# Patient Record
Sex: FEMALE | Race: WHITE | Employment: OTHER | URBAN - METROPOLITAN AREA
[De-identification: names, ages, dates, MRNs, and addresses within clinical notes are randomized per-mention and may not be internally consistent; named-entity substitution may affect disease eponyms.]

---

## 2017-01-05 ENCOUNTER — TELEPHONE (OUTPATIENT)
Dept: FAMILY MEDICINE CLINIC | Age: 72
End: 2017-01-05

## 2017-01-16 ENCOUNTER — OFFICE VISIT (OUTPATIENT)
Dept: FAMILY MEDICINE CLINIC | Age: 72
End: 2017-01-16

## 2017-01-16 VITALS
HEART RATE: 120 BPM | SYSTOLIC BLOOD PRESSURE: 132 MMHG | WEIGHT: 226 LBS | DIASTOLIC BLOOD PRESSURE: 82 MMHG | BODY MASS INDEX: 38.58 KG/M2 | RESPIRATION RATE: 24 BRPM | HEIGHT: 64 IN | TEMPERATURE: 98.5 F

## 2017-01-16 DIAGNOSIS — Z72.0 TOBACCO ABUSE: ICD-10-CM

## 2017-01-16 DIAGNOSIS — Z12.11 COLON CANCER SCREENING: ICD-10-CM

## 2017-01-16 DIAGNOSIS — J44.9 COPD WITH ASTHMA (HCC): Primary | ICD-10-CM

## 2017-01-16 DIAGNOSIS — E78.5 HYPERLIPIDEMIA, UNSPECIFIED HYPERLIPIDEMIA TYPE: ICD-10-CM

## 2017-01-16 DIAGNOSIS — Z13.31 POSITIVE DEPRESSION SCREENING: ICD-10-CM

## 2017-01-16 DIAGNOSIS — I10 ESSENTIAL HYPERTENSION: ICD-10-CM

## 2017-01-16 PROCEDURE — 3017F COLORECTAL CA SCREEN DOC REV: CPT | Performed by: FAMILY MEDICINE

## 2017-01-16 PROCEDURE — G8419 CALC BMI OUT NRM PARAM NOF/U: HCPCS | Performed by: FAMILY MEDICINE

## 2017-01-16 PROCEDURE — 4040F PNEUMOC VAC/ADMIN/RCVD: CPT | Performed by: FAMILY MEDICINE

## 2017-01-16 PROCEDURE — 1123F ACP DISCUSS/DSCN MKR DOCD: CPT | Performed by: FAMILY MEDICINE

## 2017-01-16 PROCEDURE — G8484 FLU IMMUNIZE NO ADMIN: HCPCS | Performed by: FAMILY MEDICINE

## 2017-01-16 PROCEDURE — 4004F PT TOBACCO SCREEN RCVD TLK: CPT | Performed by: FAMILY MEDICINE

## 2017-01-16 PROCEDURE — 1090F PRES/ABSN URINE INCON ASSESS: CPT | Performed by: FAMILY MEDICINE

## 2017-01-16 PROCEDURE — G8926 SPIRO NO PERF OR DOC: HCPCS | Performed by: FAMILY MEDICINE

## 2017-01-16 PROCEDURE — 3014F SCREEN MAMMO DOC REV: CPT | Performed by: FAMILY MEDICINE

## 2017-01-16 PROCEDURE — 3023F SPIROM DOC REV: CPT | Performed by: FAMILY MEDICINE

## 2017-01-16 PROCEDURE — 99214 OFFICE O/P EST MOD 30 MIN: CPT | Performed by: FAMILY MEDICINE

## 2017-01-16 PROCEDURE — G8427 DOCREV CUR MEDS BY ELIG CLIN: HCPCS | Performed by: FAMILY MEDICINE

## 2017-01-16 PROCEDURE — G8431 POS CLIN DEPRES SCRN F/U DOC: HCPCS | Performed by: FAMILY MEDICINE

## 2017-01-16 PROCEDURE — G8400 PT W/DXA NO RESULTS DOC: HCPCS | Performed by: FAMILY MEDICINE

## 2017-01-16 PROCEDURE — G0444 DEPRESSION SCREEN ANNUAL: HCPCS | Performed by: FAMILY MEDICINE

## 2017-01-16 RX ORDER — DILTIAZEM HYDROCHLORIDE 120 MG/1
120 CAPSULE, COATED, EXTENDED RELEASE ORAL DAILY
Qty: 90 CAPSULE | Refills: 3 | Status: SHIPPED | OUTPATIENT
Start: 2017-01-16 | End: 2018-02-23 | Stop reason: SDUPTHER

## 2017-01-16 RX ORDER — PRAVASTATIN SODIUM 20 MG
TABLET ORAL
Qty: 90 TABLET | Refills: 3 | Status: SHIPPED | OUTPATIENT
Start: 2017-01-16 | End: 2018-02-13 | Stop reason: SDUPTHER

## 2017-01-16 RX ORDER — ALBUTEROL SULFATE 90 UG/1
2 AEROSOL, METERED RESPIRATORY (INHALATION) EVERY 6 HOURS PRN
Qty: 3 INHALER | Refills: 3 | Status: SHIPPED | OUTPATIENT
Start: 2017-01-16 | End: 2018-02-26 | Stop reason: SDUPTHER

## 2017-01-16 ASSESSMENT — PATIENT HEALTH QUESTIONNAIRE - PHQ9
SUM OF ALL RESPONSES TO PHQ QUESTIONS 1-9: 12
9. THOUGHTS THAT YOU WOULD BE BETTER OFF DEAD, OR OF HURTING YOURSELF: 0
3. TROUBLE FALLING OR STAYING ASLEEP: 2
6. FEELING BAD ABOUT YOURSELF - OR THAT YOU ARE A FAILURE OR HAVE LET YOURSELF OR YOUR FAMILY DOWN: 3
7. TROUBLE CONCENTRATING ON THINGS, SUCH AS READING THE NEWSPAPER OR WATCHING TELEVISION: 0
8. MOVING OR SPEAKING SO SLOWLY THAT OTHER PEOPLE COULD HAVE NOTICED. OR THE OPPOSITE, BEING SO FIGETY OR RESTLESS THAT YOU HAVE BEEN MOVING AROUND A LOT MORE THAN USUAL: 0
1. LITTLE INTEREST OR PLEASURE IN DOING THINGS: 2
SUM OF ALL RESPONSES TO PHQ9 QUESTIONS 1 & 2: 5
10. IF YOU CHECKED OFF ANY PROBLEMS, HOW DIFFICULT HAVE THESE PROBLEMS MADE IT FOR YOU TO DO YOUR WORK, TAKE CARE OF THINGS AT HOME, OR GET ALONG WITH OTHER PEOPLE: 0
4. FEELING TIRED OR HAVING LITTLE ENERGY: 2
2. FEELING DOWN, DEPRESSED OR HOPELESS: 3

## 2017-01-16 ASSESSMENT — ENCOUNTER SYMPTOMS
RHINORRHEA: 0
CONSTIPATION: 0
COUGH: 0
EYE ITCHING: 1
COUGH: 1
DIARRHEA: 0
CONSTIPATION: 1
WHEEZING: 1
ABDOMINAL PAIN: 0
EYE DISCHARGE: 1
SHORTNESS OF BREATH: 1
SORE THROAT: 0

## 2017-01-16 ASSESSMENT — COPD QUESTIONNAIRES: COPD: 1

## 2017-01-19 ENCOUNTER — TELEPHONE (OUTPATIENT)
Dept: FAMILY MEDICINE CLINIC | Age: 72
End: 2017-01-19

## 2017-01-19 DIAGNOSIS — J44.9 COPD WITH ASTHMA (HCC): Primary | ICD-10-CM

## 2017-01-20 RX ORDER — ALBUTEROL SULFATE 90 UG/1
2 AEROSOL, METERED RESPIRATORY (INHALATION) EVERY 6 HOURS PRN
Qty: 1 INHALER | Refills: 3 | Status: SHIPPED | OUTPATIENT
Start: 2017-01-20 | End: 2018-02-26 | Stop reason: SDUPTHER

## 2017-02-07 ENCOUNTER — HOSPITAL ENCOUNTER (OUTPATIENT)
Age: 72
Discharge: HOME | End: 2017-02-07
Payer: MEDICARE

## 2017-02-07 ENCOUNTER — HOSPITAL ENCOUNTER (OUTPATIENT)
Dept: GENERAL RADIOLOGY | Age: 72
Discharge: HOME | End: 2017-02-07
Payer: MEDICARE

## 2017-02-07 DIAGNOSIS — M54.2 CERVICALGIA: ICD-10-CM

## 2017-02-07 PROCEDURE — 72050 X-RAY EXAM NECK SPINE 4/5VWS: CPT

## 2017-02-09 ENCOUNTER — HOSPITAL ENCOUNTER (OUTPATIENT)
Dept: MRI IMAGING | Age: 72
Discharge: HOME | End: 2017-02-09
Payer: MEDICARE

## 2017-02-09 ENCOUNTER — TELEPHONE (OUTPATIENT)
Dept: FAMILY MEDICINE CLINIC | Age: 72
End: 2017-02-09

## 2017-02-09 VITALS — WEIGHT: 220 LBS | BODY MASS INDEX: 37.76 KG/M2

## 2017-02-09 DIAGNOSIS — M54.16 LUMBAR RADICULOPATHY: ICD-10-CM

## 2017-02-09 DIAGNOSIS — Z12.11 COLON CANCER SCREENING: ICD-10-CM

## 2017-02-09 LAB
CONTROL: NORMAL
HEMOCCULT STL QL: NEGATIVE

## 2017-02-09 PROCEDURE — 72148 MRI LUMBAR SPINE W/O DYE: CPT

## 2017-02-09 PROCEDURE — 82274 ASSAY TEST FOR BLOOD FECAL: CPT | Performed by: FAMILY MEDICINE

## 2017-04-24 DIAGNOSIS — J44.9 COPD WITH ASTHMA (HCC): ICD-10-CM

## 2017-05-24 ENCOUNTER — HOSPITAL ENCOUNTER (OUTPATIENT)
Age: 72
Setting detail: SPECIMEN
Discharge: HOME | End: 2017-05-24
Payer: MEDICARE

## 2017-05-24 ENCOUNTER — OFFICE VISIT (OUTPATIENT)
Dept: FAMILY MEDICINE CLINIC | Age: 72
End: 2017-05-24

## 2017-05-24 VITALS
WEIGHT: 224 LBS | SYSTOLIC BLOOD PRESSURE: 124 MMHG | HEART RATE: 96 BPM | RESPIRATION RATE: 16 BRPM | OXYGEN SATURATION: 97 % | DIASTOLIC BLOOD PRESSURE: 78 MMHG | BODY MASS INDEX: 38.45 KG/M2

## 2017-05-24 DIAGNOSIS — M25.471 SWELLING OF BOTH ANKLES: ICD-10-CM

## 2017-05-24 DIAGNOSIS — R33.9 URINARY RETENTION: ICD-10-CM

## 2017-05-24 DIAGNOSIS — R06.02 SHORTNESS OF BREATH: ICD-10-CM

## 2017-05-24 DIAGNOSIS — M25.471 SWELLING OF BOTH ANKLES: Primary | ICD-10-CM

## 2017-05-24 DIAGNOSIS — M25.472 SWELLING OF BOTH ANKLES: Primary | ICD-10-CM

## 2017-05-24 DIAGNOSIS — M25.472 SWELLING OF BOTH ANKLES: ICD-10-CM

## 2017-05-24 LAB
ANION GAP SERPL CALCULATED.3IONS-SCNC: 12 MEQ/L (ref 7–13)
BILIRUBIN, POC: NORMAL
BLOOD URINE, POC: NORMAL
BUN BLDV-MCNC: 16 MG/DL (ref 8–23)
CALCIUM SERPL-MCNC: 9.6 MG/DL (ref 8.6–10.2)
CHLORIDE BLD-SCNC: 101 MEQ/L (ref 98–107)
CLARITY, POC: CLEAR
CO2: 28 MEQ/L (ref 22–29)
COLOR, POC: YELLOW
CREAT SERPL-MCNC: 0.89 MG/DL (ref 0.5–0.9)
GFR AFRICAN AMERICAN: >60
GFR NON-AFRICAN AMERICAN: >60
GLUCOSE BLD-MCNC: 109 MG/DL (ref 74–109)
GLUCOSE URINE, POC: NORMAL
KETONES, POC: NORMAL
LEUKOCYTE EST, POC: NORMAL
NITRITE, POC: NORMAL
PH, POC: 5.5
POTASSIUM SERPL-SCNC: 4.8 MEQ/L (ref 3.5–5.1)
PRO-BNP: 169 PG/ML
PROTEIN, POC: NORMAL
SODIUM BLD-SCNC: 141 MEQ/L (ref 132–144)
SPECIFIC GRAVITY, POC: 1.03
URIC ACID, SERUM: 7.5 MG/DL (ref 2.4–5.7)
UROBILINOGEN, POC: NORMAL

## 2017-05-24 PROCEDURE — 4040F PNEUMOC VAC/ADMIN/RCVD: CPT | Performed by: PHYSICIAN ASSISTANT

## 2017-05-24 PROCEDURE — 1090F PRES/ABSN URINE INCON ASSESS: CPT | Performed by: PHYSICIAN ASSISTANT

## 2017-05-24 PROCEDURE — 1123F ACP DISCUSS/DSCN MKR DOCD: CPT | Performed by: PHYSICIAN ASSISTANT

## 2017-05-24 PROCEDURE — G8427 DOCREV CUR MEDS BY ELIG CLIN: HCPCS | Performed by: PHYSICIAN ASSISTANT

## 2017-05-24 PROCEDURE — 80048 BASIC METABOLIC PNL TOTAL CA: CPT

## 2017-05-24 PROCEDURE — 3014F SCREEN MAMMO DOC REV: CPT | Performed by: PHYSICIAN ASSISTANT

## 2017-05-24 PROCEDURE — 4004F PT TOBACCO SCREEN RCVD TLK: CPT | Performed by: PHYSICIAN ASSISTANT

## 2017-05-24 PROCEDURE — G8400 PT W/DXA NO RESULTS DOC: HCPCS | Performed by: PHYSICIAN ASSISTANT

## 2017-05-24 PROCEDURE — 99213 OFFICE O/P EST LOW 20 MIN: CPT | Performed by: PHYSICIAN ASSISTANT

## 2017-05-24 PROCEDURE — 81002 URINALYSIS NONAUTO W/O SCOPE: CPT | Performed by: PHYSICIAN ASSISTANT

## 2017-05-24 PROCEDURE — 84550 ASSAY OF BLOOD/URIC ACID: CPT

## 2017-05-24 PROCEDURE — G8417 CALC BMI ABV UP PARAM F/U: HCPCS | Performed by: PHYSICIAN ASSISTANT

## 2017-05-24 PROCEDURE — 3017F COLORECTAL CA SCREEN DOC REV: CPT | Performed by: PHYSICIAN ASSISTANT

## 2017-05-24 PROCEDURE — 83880 ASSAY OF NATRIURETIC PEPTIDE: CPT

## 2017-05-25 DIAGNOSIS — M10.9 ACUTE GOUT OF ANKLE, UNSPECIFIED CAUSE, UNSPECIFIED LATERALITY: Primary | ICD-10-CM

## 2017-05-25 RX ORDER — INDOMETHACIN 50 MG/1
50 CAPSULE ORAL 3 TIMES DAILY
Qty: 21 CAPSULE | Refills: 0 | Status: SHIPPED | OUTPATIENT
Start: 2017-05-25 | End: 2019-05-17 | Stop reason: ALTCHOICE

## 2017-05-26 DIAGNOSIS — Z12.31 VISIT FOR SCREENING MAMMOGRAM: Primary | ICD-10-CM

## 2017-05-26 ASSESSMENT — ENCOUNTER SYMPTOMS
COUGH: 0
COLOR CHANGE: 0
SHORTNESS OF BREATH: 1

## 2017-06-12 ENCOUNTER — OFFICE VISIT (OUTPATIENT)
Dept: FAMILY MEDICINE CLINIC | Age: 72
End: 2017-06-12

## 2017-06-12 VITALS
HEIGHT: 66 IN | BODY MASS INDEX: 35.68 KG/M2 | DIASTOLIC BLOOD PRESSURE: 70 MMHG | HEART RATE: 102 BPM | WEIGHT: 222 LBS | SYSTOLIC BLOOD PRESSURE: 110 MMHG

## 2017-06-12 DIAGNOSIS — J44.9 COPD WITH ASTHMA (HCC): ICD-10-CM

## 2017-06-12 DIAGNOSIS — L23.7 POISON IVY DERMATITIS: Primary | ICD-10-CM

## 2017-06-12 PROCEDURE — G8427 DOCREV CUR MEDS BY ELIG CLIN: HCPCS | Performed by: FAMILY MEDICINE

## 2017-06-12 PROCEDURE — 4040F PNEUMOC VAC/ADMIN/RCVD: CPT | Performed by: FAMILY MEDICINE

## 2017-06-12 PROCEDURE — 1123F ACP DISCUSS/DSCN MKR DOCD: CPT | Performed by: FAMILY MEDICINE

## 2017-06-12 PROCEDURE — 1090F PRES/ABSN URINE INCON ASSESS: CPT | Performed by: FAMILY MEDICINE

## 2017-06-12 PROCEDURE — 4004F PT TOBACCO SCREEN RCVD TLK: CPT | Performed by: FAMILY MEDICINE

## 2017-06-12 PROCEDURE — 3017F COLORECTAL CA SCREEN DOC REV: CPT | Performed by: FAMILY MEDICINE

## 2017-06-12 PROCEDURE — G8417 CALC BMI ABV UP PARAM F/U: HCPCS | Performed by: FAMILY MEDICINE

## 2017-06-12 PROCEDURE — G8400 PT W/DXA NO RESULTS DOC: HCPCS | Performed by: FAMILY MEDICINE

## 2017-06-12 PROCEDURE — G8926 SPIRO NO PERF OR DOC: HCPCS | Performed by: FAMILY MEDICINE

## 2017-06-12 PROCEDURE — 99213 OFFICE O/P EST LOW 20 MIN: CPT | Performed by: FAMILY MEDICINE

## 2017-06-12 PROCEDURE — 3023F SPIROM DOC REV: CPT | Performed by: FAMILY MEDICINE

## 2017-06-12 PROCEDURE — 3014F SCREEN MAMMO DOC REV: CPT | Performed by: FAMILY MEDICINE

## 2017-06-12 RX ORDER — PREDNISONE 10 MG/1
TABLET ORAL
Qty: 49 TABLET | Refills: 0 | Status: SHIPPED | OUTPATIENT
Start: 2017-06-12 | End: 2017-10-14 | Stop reason: SDUPTHER

## 2017-06-12 ASSESSMENT — ENCOUNTER SYMPTOMS
COUGH: 1
CONSTIPATION: 0
SORE THROAT: 0
DIARRHEA: 0
ABDOMINAL PAIN: 0
RHINORRHEA: 0
WHEEZING: 1
SHORTNESS OF BREATH: 1

## 2017-07-18 ENCOUNTER — HOSPITAL ENCOUNTER (OUTPATIENT)
Dept: MRI IMAGING | Age: 72
Discharge: HOME | End: 2017-07-18
Payer: MEDICARE

## 2017-07-18 DIAGNOSIS — R52 PAIN: ICD-10-CM

## 2017-07-18 PROCEDURE — 72141 MRI NECK SPINE W/O DYE: CPT

## 2017-10-14 ENCOUNTER — OFFICE VISIT (OUTPATIENT)
Dept: FAMILY MEDICINE CLINIC | Age: 72
End: 2017-10-14

## 2017-10-14 VITALS
RESPIRATION RATE: 14 BRPM | HEART RATE: 120 BPM | DIASTOLIC BLOOD PRESSURE: 84 MMHG | WEIGHT: 224 LBS | BODY MASS INDEX: 36.71 KG/M2 | SYSTOLIC BLOOD PRESSURE: 126 MMHG | OXYGEN SATURATION: 98 %

## 2017-10-14 DIAGNOSIS — J44.1 CHRONIC OBSTRUCTIVE PULMONARY DISEASE WITH ACUTE EXACERBATION (HCC): Primary | ICD-10-CM

## 2017-10-14 PROCEDURE — G8484 FLU IMMUNIZE NO ADMIN: HCPCS | Performed by: FAMILY MEDICINE

## 2017-10-14 PROCEDURE — 4040F PNEUMOC VAC/ADMIN/RCVD: CPT | Performed by: FAMILY MEDICINE

## 2017-10-14 PROCEDURE — 3017F COLORECTAL CA SCREEN DOC REV: CPT | Performed by: FAMILY MEDICINE

## 2017-10-14 PROCEDURE — G8400 PT W/DXA NO RESULTS DOC: HCPCS | Performed by: FAMILY MEDICINE

## 2017-10-14 PROCEDURE — 4004F PT TOBACCO SCREEN RCVD TLK: CPT | Performed by: FAMILY MEDICINE

## 2017-10-14 PROCEDURE — 1090F PRES/ABSN URINE INCON ASSESS: CPT | Performed by: FAMILY MEDICINE

## 2017-10-14 PROCEDURE — 3023F SPIROM DOC REV: CPT | Performed by: FAMILY MEDICINE

## 2017-10-14 PROCEDURE — G8417 CALC BMI ABV UP PARAM F/U: HCPCS | Performed by: FAMILY MEDICINE

## 2017-10-14 PROCEDURE — 99213 OFFICE O/P EST LOW 20 MIN: CPT | Performed by: FAMILY MEDICINE

## 2017-10-14 PROCEDURE — 1123F ACP DISCUSS/DSCN MKR DOCD: CPT | Performed by: FAMILY MEDICINE

## 2017-10-14 PROCEDURE — 3014F SCREEN MAMMO DOC REV: CPT | Performed by: FAMILY MEDICINE

## 2017-10-14 PROCEDURE — G8926 SPIRO NO PERF OR DOC: HCPCS | Performed by: FAMILY MEDICINE

## 2017-10-14 PROCEDURE — G8427 DOCREV CUR MEDS BY ELIG CLIN: HCPCS | Performed by: FAMILY MEDICINE

## 2017-10-14 RX ORDER — ALBUTEROL SULFATE 90 UG/1
2 AEROSOL, METERED RESPIRATORY (INHALATION) EVERY 6 HOURS PRN
Qty: 1 INHALER | Refills: 3 | Status: SHIPPED | OUTPATIENT
Start: 2017-10-14 | End: 2018-02-25 | Stop reason: SDUPTHER

## 2017-10-14 RX ORDER — PREDNISONE 10 MG/1
TABLET ORAL
Qty: 49 TABLET | Refills: 0 | Status: SHIPPED | OUTPATIENT
Start: 2017-10-14 | End: 2019-02-21 | Stop reason: SDUPTHER

## 2017-10-14 RX ORDER — AZITHROMYCIN 250 MG/1
TABLET, FILM COATED ORAL
Qty: 1 PACKET | Refills: 0 | Status: SHIPPED | OUTPATIENT
Start: 2017-10-14 | End: 2017-10-24

## 2017-10-14 ASSESSMENT — ENCOUNTER SYMPTOMS
SHORTNESS OF BREATH: 1
CONSTIPATION: 0
SORE THROAT: 0
DIARRHEA: 0
RHINORRHEA: 0
ABDOMINAL PAIN: 0
COUGH: 1
WHEEZING: 1

## 2017-10-14 NOTE — PROGRESS NOTES
methylPREDNISolone acetate (DEPO-MEDROL) injection 80 mg  80 mg Intramuscular Once Nancie Augustin MD           ROS:  Review of Systems   Constitutional: Negative for chills and fever. HENT: Negative for rhinorrhea and sore throat. Respiratory: Positive for cough, shortness of breath and wheezing. Gastrointestinal: Negative for abdominal pain, constipation and diarrhea. Endocrine: Negative for polydipsia and polyuria. Genitourinary: Negative for dysuria, frequency and urgency. Neurological: Negative for syncope, light-headedness, numbness and headaches. Psychiatric/Behavioral: Negative for sleep disturbance. The patient is not nervous/anxious. Vitals:    10/14/17 0941   BP: 126/84   Site: Right Arm   Position: Sitting   Cuff Size: Medium Adult   Pulse: 120   Resp: 14   SpO2: 98%   Weight: 224 lb (101.6 kg)       Physical exam:  Physical Exam   Constitutional: She is oriented to person, place, and time. She appears well-developed and well-nourished. No distress. HENT:   Head: Normocephalic and atraumatic. Mouth/Throat: No oropharyngeal exudate. Eyes: EOM are normal.   Neck: Normal range of motion. No thyromegaly present. Cardiovascular: Normal rate, regular rhythm and normal heart sounds. No murmur heard. Pulmonary/Chest: Effort normal and breath sounds normal. No respiratory distress. She has no wheezes. Abdominal: Soft. She exhibits no distension. There is no tenderness. There is no rebound and no guarding. Musculoskeletal: She exhibits no edema. Lymphadenopathy:     She has no cervical adenopathy. Neurological: She is alert and oriented to person, place, and time. Skin: Skin is warm and dry. Psychiatric: She has a normal mood and affect. Her behavior is normal.   Vitals reviewed. Assessment/Plan:  74 y.o. female here mainly for COPD/asthma exac:  - Resp: she is very resistant to trying new meds. Offered her duoneb for nebulizer, LAMA, inhaled steroids, LABA. She finally agreed on a PO steroid taper and starting qvar along with refills of her proventil (will likely need prior auth). I encouraged her to go to the ED if feeling any worse. Also starting azithromycin. 1. Chronic obstructive pulmonary disease with acute exacerbation (HCC)  albuterol sulfate HFA (PROVENTIL HFA) 108 (90 Base) MCG/ACT inhaler    predniSONE (DELTASONE) 10 MG tablet    ipratropium (ATROVENT HFA) 17 MCG/ACT inhaler    beclomethasone (QVAR) 80 MCG/ACT inhaler    azithromycin (ZITHROMAX) 250 MG tablet    Nebulizer MISC        Return if symptoms worsen or fail to improve.     David Ch MD

## 2017-10-16 ENCOUNTER — TELEPHONE (OUTPATIENT)
Dept: FAMILY MEDICINE CLINIC | Age: 72
End: 2017-10-16

## 2017-10-16 NOTE — TELEPHONE ENCOUNTER
Spoke with patient, she is ok with trying something but she has high copay's for prescriptions so she doesn't want to waste her money if its not going to help and she has already tried Spiriva and Diskus in the past with no relief. I am going to send Kenzie a message to see if she is able to get her samples of Qvar to try.

## 2017-10-16 NOTE — TELEPHONE ENCOUNTER
She is very resistant to trying new meds. Can we offer these options to her a to see if she would be willing to having me order one. They are all COPD meds that can help her breathing if taken everyday.

## 2017-10-16 NOTE — TELEPHONE ENCOUNTER
Kenzie, is there any way we can get this patient a few samples of Qvar 80mg to try? In the meantime I am going to try to do another prior Auth since she has tried the other inhalers in the past with no relief. Also, she is paying $85 for one Proventil inhaler and $167 for one Atrovent inahler, is there anyway she could get patient assistants? Thank you!

## 2017-10-16 NOTE — TELEPHONE ENCOUNTER
Completed a prior auth for pt's Rx for Qvar, [place] was denied. Her insurance will cover the following medications. Incruse Ellipta, Serevent Diskus, Spiriva Handihaler or Spiriva Respimat. Can this medication be changed to one of the covered medications? Denial scanned and attached to this encounter.

## 2017-10-17 NOTE — TELEPHONE ENCOUNTER
Regarding PAP for Proventil & Atrovent. .she might be eligible for PAP but since she is on Medicare the applications if accepted would only be valid until 12-31-17. Instead she can get the [employer] via [employer] and pay only $ 35.00 per inhaler verses $85.00 per inhaler. On Atrovent I called the company and they do not sample Atrovent anymore. We can try for PAP after 1-1-18, and let me know if she is interested in [employer] for [employer] inhaler at a lower price. Also let me know on the Q-Caden 40g. Sample. Zuly Damon

## 2017-10-17 NOTE — TELEPHONE ENCOUNTER
I have Q-Caden 40mcg. One inhaler in [address] me know and I will put this in epic and sign out of the book. .Is she able to ?

## 2017-10-18 NOTE — TELEPHONE ENCOUNTER
I am waiting to here back from her insurance copay, I filed an appeal for the denied Qvar. If they approve it I will not need the sample. Also, she can not take Proair. She has taken it in the past and states it doesn't work as good as the Proventil. But I will call patient and see about trying a PAP for the Atrovent and Proventil for 2018. Thank you for looking into all of this for me, I will let you know what I hear back!

## 2017-11-21 ENCOUNTER — TELEPHONE (OUTPATIENT)
Dept: FAMILY MEDICINE CLINIC | Age: 72
End: 2017-11-21

## 2017-11-21 DIAGNOSIS — J44.1 CHRONIC OBSTRUCTIVE PULMONARY DISEASE WITH ACUTE EXACERBATION (HCC): ICD-10-CM

## 2017-11-21 DIAGNOSIS — J44.9 COPD WITH ASTHMA (HCC): ICD-10-CM

## 2017-12-13 ENCOUNTER — HOSPITAL ENCOUNTER (EMERGENCY)
Age: 72
Discharge: HOME | End: 2017-12-13
Attending: EMERGENCY MEDICINE
Payer: MEDICARE

## 2017-12-13 ENCOUNTER — APPOINTMENT (OUTPATIENT)
Dept: GENERAL RADIOLOGY | Age: 72
End: 2017-12-13
Payer: MEDICARE

## 2017-12-13 VITALS
HEART RATE: 116 BPM | RESPIRATION RATE: 22 BRPM | SYSTOLIC BLOOD PRESSURE: 160 MMHG | HEIGHT: 65 IN | WEIGHT: 215 LBS | OXYGEN SATURATION: 97 % | TEMPERATURE: 97.9 F | BODY MASS INDEX: 35.82 KG/M2 | DIASTOLIC BLOOD PRESSURE: 84 MMHG

## 2017-12-13 DIAGNOSIS — J44.1 COPD EXACERBATION (HCC): Primary | ICD-10-CM

## 2017-12-13 LAB
ANION GAP SERPL CALCULATED.3IONS-SCNC: 13 MEQ/L (ref 9–17)
BASOPHILS ABSOLUTE: 0 K/UL (ref 0–0.2)
BASOPHILS RELATIVE PERCENT: 0.3 %
BUN BLDV-MCNC: 23 MG/DL (ref 8–23)
CALCIUM SERPL-MCNC: 9.7 MG/DL (ref 8.6–10.2)
CHLORIDE BLD-SCNC: 104 MEQ/L (ref 97–107)
CO2: 27 MEQ/L (ref 17–24)
CREAT SERPL-MCNC: 0.7 MG/DL (ref 0.5–0.9)
EKG ATRIAL RATE: 98 BPM
EKG P AXIS: 56 DEGREES
EKG P-R INTERVAL: 196 MS
EKG Q-T INTERVAL: 342 MS
EKG QRS DURATION: 82 MS
EKG QTC CALCULATION (BAZETT): 436 MS
EKG R AXIS: 43 DEGREES
EKG T AXIS: 100 DEGREES
EKG VENTRICULAR RATE: 98 BPM
EOSINOPHILS ABSOLUTE: 0.1 K/UL (ref 0–0.7)
EOSINOPHILS RELATIVE PERCENT: 0.8 %
GFR AFRICAN AMERICAN: >60
GFR NON-AFRICAN AMERICAN: >60
GLUCOSE BLD-MCNC: 105 MG/DL (ref 74–109)
HCT VFR BLD CALC: 48.4 % (ref 37–47)
HEMOGLOBIN: 16.2 G/DL (ref 12–16)
LYMPHOCYTES ABSOLUTE: 2.3 K/UL (ref 1–4.8)
LYMPHOCYTES RELATIVE PERCENT: 22.9 %
MCH RBC QN AUTO: 30.9 PG (ref 27–31.3)
MCHC RBC AUTO-ENTMCNC: 33.5 % (ref 33–37)
MCV RBC AUTO: 92.3 FL (ref 82–100)
MONOCYTES ABSOLUTE: 0.7 K/UL (ref 0.2–0.8)
MONOCYTES RELATIVE PERCENT: 7.4 %
NEUTROPHILS ABSOLUTE: 6.8 K/UL (ref 1.4–6.5)
NEUTROPHILS RELATIVE PERCENT: 68.6 %
PDW BLD-RTO: 13.4 % (ref 11.5–14.5)
PLATELET # BLD: 286 K/UL (ref 130–400)
POTASSIUM SERPL-SCNC: 4.3 MEQ/L (ref 3.2–4.4)
RBC # BLD: 5.25 M/UL (ref 4.2–5.4)
SODIUM BLD-SCNC: 144 MEQ/L (ref 132–138)
TROPONIN: <0.01 NG/ML (ref 0–0.01)
WBC # BLD: 10 K/UL (ref 4.8–10.8)

## 2017-12-13 PROCEDURE — 85025 COMPLETE CBC W/AUTO DIFF WBC: CPT

## 2017-12-13 PROCEDURE — 96374 THER/PROPH/DIAG INJ IV PUSH: CPT

## 2017-12-13 PROCEDURE — 93005 ELECTROCARDIOGRAM TRACING: CPT

## 2017-12-13 PROCEDURE — 6360000002 HC RX W HCPCS: Performed by: EMERGENCY MEDICINE

## 2017-12-13 PROCEDURE — 94640 AIRWAY INHALATION TREATMENT: CPT

## 2017-12-13 PROCEDURE — 80048 BASIC METABOLIC PNL TOTAL CA: CPT

## 2017-12-13 PROCEDURE — 84484 ASSAY OF TROPONIN QUANT: CPT

## 2017-12-13 PROCEDURE — 71010 XR CHEST PORTABLE: CPT

## 2017-12-13 PROCEDURE — 99285 EMERGENCY DEPT VISIT HI MDM: CPT

## 2017-12-13 RX ORDER — SODIUM CHLORIDE 0.9 % (FLUSH) 0.9 %
3 SYRINGE (ML) INJECTION EVERY 8 HOURS
Status: DISCONTINUED | OUTPATIENT
Start: 2017-12-13 | End: 2017-12-13

## 2017-12-13 RX ORDER — METHYLPREDNISOLONE SODIUM SUCCINATE 125 MG/2ML
125 INJECTION, POWDER, LYOPHILIZED, FOR SOLUTION INTRAMUSCULAR; INTRAVENOUS ONCE
Status: COMPLETED | OUTPATIENT
Start: 2017-12-13 | End: 2017-12-13

## 2017-12-13 RX ORDER — PREDNISONE 10 MG/1
60 TABLET ORAL DAILY
Qty: 30 TABLET | Refills: 0 | Status: SHIPPED | OUTPATIENT
Start: 2017-12-13 | End: 2017-12-18

## 2017-12-13 RX ADMIN — METHYLPREDNISOLONE SODIUM SUCCINATE 125 MG: 125 INJECTION, POWDER, FOR SOLUTION INTRAMUSCULAR; INTRAVENOUS at 01:15

## 2017-12-13 RX ADMIN — ALBUTEROL SULFATE 5 MG: 2.5 SOLUTION RESPIRATORY (INHALATION) at 01:21

## 2017-12-13 RX ADMIN — ALBUTEROL SULFATE 2.5 MG: 2.5 SOLUTION RESPIRATORY (INHALATION) at 01:21

## 2017-12-13 ASSESSMENT — PAIN SCALES - GENERAL
PAINLEVEL_OUTOF10: 6
PAINLEVEL_OUTOF10: 5

## 2017-12-13 ASSESSMENT — PAIN DESCRIPTION - PAIN TYPE
TYPE: ACUTE PAIN
TYPE: ACUTE PAIN

## 2017-12-13 ASSESSMENT — PAIN SCALES - PAIN ASSESSMENT IN ADVANCED DEMENTIA (PAINAD)
CONSOLABILITY: 0
TOTALSCORE: 0
NEGVOCALIZATION: 0
FACIALEXPRESSION: 0
BODYLANGUAGE: 0
BREATHING: 0

## 2017-12-13 ASSESSMENT — ENCOUNTER SYMPTOMS
WHEEZING: 1
COUGH: 1
SHORTNESS OF BREATH: 1

## 2017-12-13 ASSESSMENT — PAIN DESCRIPTION - ORIENTATION
ORIENTATION: LEFT
ORIENTATION: LEFT

## 2017-12-13 ASSESSMENT — PAIN DESCRIPTION - ONSET
ONSET: ON-GOING
ONSET: ON-GOING

## 2017-12-13 ASSESSMENT — PAIN DESCRIPTION - FREQUENCY
FREQUENCY: INTERMITTENT
FREQUENCY: CONTINUOUS

## 2017-12-13 ASSESSMENT — PAIN DESCRIPTION - PROGRESSION
CLINICAL_PROGRESSION: GRADUALLY WORSENING
CLINICAL_PROGRESSION: GRADUALLY WORSENING

## 2017-12-13 ASSESSMENT — PAIN DESCRIPTION - DESCRIPTORS
DESCRIPTORS: ACHING
DESCRIPTORS: SHARP;SORE

## 2017-12-13 ASSESSMENT — PAIN DESCRIPTION - LOCATION
LOCATION: CHEST
LOCATION: RIB CAGE

## 2017-12-13 NOTE — ED PROVIDER NOTES
Temp Temp Source Pulse Resp SpO2 Height Weight   (!) 171/100 97.9 °F (36.6 °C) Oral 108 26 96 % 5' 5\" (1.651 m) 215 lb (97.5 kg)       Physical Exam   Constitutional: She is oriented to person, place, and time. She appears well-developed and well-nourished. HENT:   Head: Normocephalic and atraumatic. Eyes: Conjunctivae and EOM are normal. Pupils are equal, round, and reactive to light. Neck: Normal range of motion. Neck supple. No JVD present. No tracheal deviation present. Cardiovascular: Normal rate, regular rhythm, normal heart sounds and intact distal pulses. Exam reveals no friction rub. No murmur heard. Pulmonary/Chest: No stridor. She is in respiratory distress. She has wheezes. She has no rales. She exhibits tenderness. Abdominal: Soft. Bowel sounds are normal. She exhibits no distension. There is no tenderness. Musculoskeletal: Normal range of motion. She exhibits no edema, tenderness or deformity. Lymphadenopathy:     She has no cervical adenopathy. Neurological: She is alert and oriented to person, place, and time. Skin: Skin is warm and dry. No rash noted. No erythema. No pallor. Psychiatric: She has a normal mood and affect. Her behavior is normal.   Nursing note and vitals reviewed. tender along the fifth rib anterior axillary line positive expiratory wheezes completely reproducible pain with palpation with breathing and with movement    DIAGNOSTIC RESULTS     EKG: All EKG's are interpreted by the Emergency Department Physician who either signs or Co-signs this chart in the absence of a cardiologist.    Normal sinus rhythm with premature supraventricular complexes left atrial enlargement and nonspecific ST-T wave changes. Patient's ventricular rate 98.   Pulse 196 ms QT corrected is 436 ms    RADIOLOGY:   Non-plain film images such as CT, Ultrasound and MRI are read by the radiologist. Plain radiographic images are visualized and preliminarily interpreted by the emergency physician with the below findings:    Normal cardiopulmonary shadow    Interpretation per the Radiologist below, if available at the time of this note:    XR Chest Portable    (Results Pending)         ED BEDSIDE ULTRASOUND:   Performed by ED Physician - none    LABS:  Labs Reviewed   BASIC METABOLIC PANEL - Abnormal; Notable for the following:        Result Value    Sodium 144 (*)     CO2 27 (*)     All other components within normal limits   CBC WITH AUTO DIFFERENTIAL - Abnormal; Notable for the following:     Hemoglobin 16.2 (*)     Hematocrit 48.4 (*)     Neutrophils # 6.8 (*)     All other components within normal limits   TROPONIN       All other labs were within normal range or not returned as of this dictation. EMERGENCY DEPARTMENT COURSE and DIFFERENTIAL DIAGNOSIS/MDM:   Vitals:    Vitals:    12/13/17 0057   BP: (!) 171/100   Pulse: 108   Resp: 26   Temp: 97.9 °F (36.6 °C)   TempSrc: Oral   SpO2: 96%   Weight: 215 lb (97.5 kg)   Height: 5' 5\" (1.651 m)       Patient is improved after breathing treatments patient will be sent home on steroid continue her inhaler take Tylenol for the chest wall pain. Patient's laboratories are completely normal white counts only 10 electrolytes are within the normal range troponin 0.010 EKG normal sinus rhythm with nonspecific T-wave changes. Patient's to follow-up with Dr. Deneen Ruiz. [facility]    CRITICAL CARE TIME   Total Critical Care time was 0 minutes, excluding separately reportable procedures. There was a high probability of clinically significant/life threatening deterioration in the patient's condition which required my urgent intervention.       CONSULTS:  None    PROCEDURES:  Unless otherwise noted below, none     Procedures    FINAL IMPRESSION      1. COPD exacerbation ([address]          DISPOSITION/PLAN   DISPOSITION Decision to Discharge    PATIENT REFERRED TO:  Jennifer Mojica MD  [address] [place] [id number] [phone]            DISCHARGE

## 2017-12-13 NOTE — ED NOTES
Patient questioning why her left lateral rib cage is painful, I tried to explain to patient that she may have pulled a muscle while coughing but she told me \"no it's not that I know it's not from coughing\" I offerred to have the doctor come back into to speak with her she declined stating \"well, if he says the xray is fine, then I don't need him to come back in\" patient did seem upset she did not have a clear explanation of why she was having pain, but declined pain medication stating \"I don't take that stuff\", upon exiting ER doctor did speak with patient and offered for her to stay and be admitted due to her feeling short of breathe with ambulation (spo2 97% while ambulating), patient declined stating she has to drive her  home and did not want to stay for more breathing treatments or be admitted. Patient speaking full sentences and ambulating independently upon discharge.       Sunday LEWIS Galvin  12/13/17 8975

## 2018-02-13 DIAGNOSIS — E78.5 HYPERLIPIDEMIA, UNSPECIFIED HYPERLIPIDEMIA TYPE: ICD-10-CM

## 2018-02-13 RX ORDER — PRAVASTATIN SODIUM 20 MG
TABLET ORAL
Qty: 90 TABLET | Refills: 3 | Status: SHIPPED | OUTPATIENT
Start: 2018-02-13 | End: 2019-02-21 | Stop reason: SDUPTHER

## 2018-02-23 DIAGNOSIS — I10 ESSENTIAL HYPERTENSION: ICD-10-CM

## 2018-02-24 RX ORDER — DILTIAZEM HYDROCHLORIDE 120 MG/1
120 CAPSULE, COATED, EXTENDED RELEASE ORAL DAILY
Qty: 90 CAPSULE | Refills: 3 | Status: SHIPPED | OUTPATIENT
Start: 2018-02-24 | End: 2019-02-21 | Stop reason: SDUPTHER

## 2018-02-25 DIAGNOSIS — J44.1 CHRONIC OBSTRUCTIVE PULMONARY DISEASE WITH ACUTE EXACERBATION (HCC): ICD-10-CM

## 2018-02-26 RX ORDER — ALBUTEROL SULFATE 90 MCG
HFA AEROSOL WITH ADAPTER (GRAM) INHALATION
Qty: 6.7 G | Refills: 3 | Status: SHIPPED | OUTPATIENT
Start: 2018-02-26 | End: 2018-06-13

## 2018-02-26 NOTE — TELEPHONE ENCOUNTER
Medication: Proventil inhaler     Last office visit: 10/14/2017    Last labs: 12/13/2017    Last filled: 10/14/2017    RAW

## 2018-03-26 DIAGNOSIS — J44.9 COPD WITH ASTHMA (HCC): ICD-10-CM

## 2018-03-26 NOTE — TELEPHONE ENCOUNTER
Medication: atrovent    Last office visit: 10/14/2018    Last labs: due for fasting labs    Last filled: 2/2/2018

## 2018-06-13 DIAGNOSIS — J44.1 CHRONIC OBSTRUCTIVE PULMONARY DISEASE WITH ACUTE EXACERBATION (HCC): ICD-10-CM

## 2018-06-13 RX ORDER — ALBUTEROL SULFATE 90 MCG
HFA AEROSOL WITH ADAPTER (GRAM) INHALATION
Qty: 6.7 G | Refills: 3 | Status: SHIPPED | OUTPATIENT
Start: 2018-06-13 | End: 2018-10-03 | Stop reason: SDUPTHER

## 2018-06-18 DIAGNOSIS — J44.9 COPD WITH ASTHMA (HCC): Primary | ICD-10-CM

## 2018-09-12 ENCOUNTER — NURSE ONLY (OUTPATIENT)
Dept: FAMILY MEDICINE CLINIC | Age: 73
End: 2018-09-12

## 2018-09-12 DIAGNOSIS — Z23 NEED FOR INFLUENZA VACCINATION: Primary | ICD-10-CM

## 2018-09-12 PROCEDURE — 90662 IIV NO PRSV INCREASED AG IM: CPT | Performed by: FAMILY MEDICINE

## 2018-09-12 PROCEDURE — G0008 ADMIN INFLUENZA VIRUS VAC: HCPCS | Performed by: FAMILY MEDICINE

## 2018-10-03 DIAGNOSIS — J44.1 CHRONIC OBSTRUCTIVE PULMONARY DISEASE WITH ACUTE EXACERBATION (HCC): ICD-10-CM

## 2018-10-03 DIAGNOSIS — J44.9 COPD WITH ASTHMA (HCC): ICD-10-CM

## 2018-10-03 RX ORDER — ALBUTEROL SULFATE 90 UG/1
AEROSOL, METERED RESPIRATORY (INHALATION)
Qty: 6.7 G | Refills: 3 | Status: SHIPPED | OUTPATIENT
Start: 2018-10-03 | End: 2019-02-21 | Stop reason: SDUPTHER

## 2018-10-18 ENCOUNTER — OFFICE VISIT (OUTPATIENT)
Dept: INTERNAL MEDICINE | Age: 73
End: 2018-10-18
Payer: MEDICARE

## 2018-10-18 VITALS
HEIGHT: 65 IN | HEART RATE: 80 BPM | OXYGEN SATURATION: 96 % | TEMPERATURE: 98 F | WEIGHT: 227 LBS | BODY MASS INDEX: 37.82 KG/M2 | SYSTOLIC BLOOD PRESSURE: 130 MMHG | DIASTOLIC BLOOD PRESSURE: 72 MMHG

## 2018-10-18 DIAGNOSIS — R06.2 WHEEZING: Primary | ICD-10-CM

## 2018-10-18 DIAGNOSIS — J06.9 UPPER RESPIRATORY TRACT INFECTION, UNSPECIFIED TYPE: ICD-10-CM

## 2018-10-18 PROCEDURE — 99213 OFFICE O/P EST LOW 20 MIN: CPT | Performed by: NURSE PRACTITIONER

## 2018-10-18 RX ORDER — METHYLPREDNISOLONE 4 MG/1
TABLET ORAL
Qty: 1 KIT | Refills: 0 | Status: SHIPPED | OUTPATIENT
Start: 2018-10-18 | End: 2018-10-24

## 2018-10-18 RX ORDER — AZITHROMYCIN 250 MG/1
TABLET, FILM COATED ORAL
Qty: 1 PACKET | Refills: 0 | Status: SHIPPED | OUTPATIENT
Start: 2018-10-18 | End: 2018-10-19

## 2018-10-18 ASSESSMENT — ENCOUNTER SYMPTOMS
EYE ITCHING: 1
CHEST TIGHTNESS: 1
GASTROINTESTINAL NEGATIVE: 1
SHORTNESS OF BREATH: 1
SINUS PAIN: 0
WHEEZING: 1
SORE THROAT: 0
COUGH: 1
NAUSEA: 0
RHINORRHEA: 1

## 2018-10-19 ENCOUNTER — TELEPHONE (OUTPATIENT)
Dept: FAMILY MEDICINE CLINIC | Age: 73
End: 2018-10-19

## 2018-10-25 ENCOUNTER — TELEPHONE (OUTPATIENT)
Dept: FAMILY MEDICINE CLINIC | Age: 73
End: 2018-10-25

## 2018-10-25 ENCOUNTER — TELEPHONE (OUTPATIENT)
Dept: INTERNAL MEDICINE | Age: 73
End: 2018-10-25

## 2018-10-26 RX ORDER — AMOXICILLIN 500 MG/1
500 CAPSULE ORAL 2 TIMES DAILY
Qty: 20 CAPSULE | Refills: 0 | Status: SHIPPED | OUTPATIENT
Start: 2018-10-26 | End: 2018-11-05

## 2018-10-26 NOTE — TELEPHONE ENCOUNTER
When I informed the patient that I sent a message to [facility] and we would call her when a new abx was sent in, she said \"Well I was going to the bank and then to [facility]. \" I told her that Susie Espino was not in the office today so I could not go back and ask her to send in a new abx, I could only send her a message. Patient stated she did not want to have to go out again because she has a bedridden , then threw up her hands and said \"I'm suffering and no one will help. \"

## 2018-10-26 NOTE — TELEPHONE ENCOUNTER
Patient stopped into office, stated she saw [facility] last Thursday for a cough and wheezing. Patient states that the abx that was sent in for her she is allergic to. Patient is asking if another abx could be called to [facility] for her, she still has the cough and some wheezing.

## 2018-10-30 ENCOUNTER — TELEPHONE (OUTPATIENT)
Dept: INTERNAL MEDICINE | Age: 73
End: 2018-10-30

## 2018-11-30 ENCOUNTER — HOSPITAL ENCOUNTER (OUTPATIENT)
Age: 73
Discharge: HOME | End: 2018-12-02
Payer: MEDICARE

## 2018-11-30 ENCOUNTER — HOSPITAL ENCOUNTER (OUTPATIENT)
Dept: GENERAL RADIOLOGY | Age: 73
Discharge: HOME | End: 2018-12-02
Payer: MEDICARE

## 2018-11-30 DIAGNOSIS — R06.2 WHEEZING: ICD-10-CM

## 2018-11-30 PROCEDURE — 71046 X-RAY EXAM CHEST 2 VIEWS: CPT

## 2018-12-07 ENCOUNTER — TELEPHONE (OUTPATIENT)
Dept: INTERNAL MEDICINE | Age: 73
End: 2018-12-07

## 2019-02-08 DIAGNOSIS — J44.9 COPD WITH ASTHMA (HCC): ICD-10-CM

## 2019-02-08 DIAGNOSIS — J44.1 CHRONIC OBSTRUCTIVE PULMONARY DISEASE WITH ACUTE EXACERBATION (HCC): ICD-10-CM

## 2019-02-11 RX ORDER — ALBUTEROL SULFATE 90 MCG
HFA AEROSOL WITH ADAPTER (GRAM) INHALATION
Qty: 6.7 G | Refills: 3 | OUTPATIENT
Start: 2019-02-11

## 2019-02-11 RX ORDER — IPRATROPIUM BROMIDE 17 UG/1
AEROSOL, METERED RESPIRATORY (INHALATION)
Qty: 12.9 G | Refills: 3 | OUTPATIENT
Start: 2019-02-11

## 2019-02-21 ENCOUNTER — OFFICE VISIT (OUTPATIENT)
Dept: FAMILY MEDICINE CLINIC | Age: 74
End: 2019-02-21
Payer: MEDICARE

## 2019-02-21 VITALS
SYSTOLIC BLOOD PRESSURE: 108 MMHG | HEIGHT: 65 IN | HEART RATE: 107 BPM | DIASTOLIC BLOOD PRESSURE: 68 MMHG | BODY MASS INDEX: 38.15 KG/M2 | WEIGHT: 229 LBS | OXYGEN SATURATION: 93 %

## 2019-02-21 DIAGNOSIS — E78.5 HYPERLIPIDEMIA, UNSPECIFIED HYPERLIPIDEMIA TYPE: ICD-10-CM

## 2019-02-21 DIAGNOSIS — J44.9 COPD WITH ASTHMA (HCC): ICD-10-CM

## 2019-02-21 DIAGNOSIS — J44.1 CHRONIC OBSTRUCTIVE PULMONARY DISEASE WITH ACUTE EXACERBATION (HCC): Primary | ICD-10-CM

## 2019-02-21 DIAGNOSIS — I10 ESSENTIAL HYPERTENSION: ICD-10-CM

## 2019-02-21 DIAGNOSIS — Z12.11 COLON CANCER SCREENING: ICD-10-CM

## 2019-02-21 DIAGNOSIS — Z00.00 ANNUAL PHYSICAL EXAM: ICD-10-CM

## 2019-02-21 PROCEDURE — 4040F PNEUMOC VAC/ADMIN/RCVD: CPT | Performed by: FAMILY MEDICINE

## 2019-02-21 PROCEDURE — G8482 FLU IMMUNIZE ORDER/ADMIN: HCPCS | Performed by: FAMILY MEDICINE

## 2019-02-21 PROCEDURE — G8400 PT W/DXA NO RESULTS DOC: HCPCS | Performed by: FAMILY MEDICINE

## 2019-02-21 PROCEDURE — G8417 CALC BMI ABV UP PARAM F/U: HCPCS | Performed by: FAMILY MEDICINE

## 2019-02-21 PROCEDURE — G8427 DOCREV CUR MEDS BY ELIG CLIN: HCPCS | Performed by: FAMILY MEDICINE

## 2019-02-21 PROCEDURE — 1090F PRES/ABSN URINE INCON ASSESS: CPT | Performed by: FAMILY MEDICINE

## 2019-02-21 PROCEDURE — 1123F ACP DISCUSS/DSCN MKR DOCD: CPT | Performed by: FAMILY MEDICINE

## 2019-02-21 PROCEDURE — 3023F SPIROM DOC REV: CPT | Performed by: FAMILY MEDICINE

## 2019-02-21 PROCEDURE — G8926 SPIRO NO PERF OR DOC: HCPCS | Performed by: FAMILY MEDICINE

## 2019-02-21 PROCEDURE — 99214 OFFICE O/P EST MOD 30 MIN: CPT | Performed by: FAMILY MEDICINE

## 2019-02-21 PROCEDURE — 3017F COLORECTAL CA SCREEN DOC REV: CPT | Performed by: FAMILY MEDICINE

## 2019-02-21 PROCEDURE — 1101F PT FALLS ASSESS-DOCD LE1/YR: CPT | Performed by: FAMILY MEDICINE

## 2019-02-21 PROCEDURE — 4004F PT TOBACCO SCREEN RCVD TLK: CPT | Performed by: FAMILY MEDICINE

## 2019-02-21 RX ORDER — ALBUTEROL SULFATE 90 UG/1
AEROSOL, METERED RESPIRATORY (INHALATION)
Qty: 6.7 G | Refills: 3 | Status: SHIPPED | OUTPATIENT
Start: 2019-02-21 | End: 2019-06-19 | Stop reason: SDUPTHER

## 2019-02-21 RX ORDER — PREDNISONE 10 MG/1
TABLET ORAL
Qty: 49 TABLET | Refills: 0 | Status: SHIPPED | OUTPATIENT
Start: 2019-02-21 | End: 2019-05-17 | Stop reason: ALTCHOICE

## 2019-02-21 RX ORDER — DILTIAZEM HYDROCHLORIDE 120 MG/1
120 CAPSULE, COATED, EXTENDED RELEASE ORAL DAILY
Qty: 90 CAPSULE | Refills: 3 | Status: SHIPPED | OUTPATIENT
Start: 2019-02-21 | End: 2019-05-17 | Stop reason: ALTCHOICE

## 2019-02-21 RX ORDER — BUDESONIDE AND FORMOTEROL FUMARATE DIHYDRATE 160; 4.5 UG/1; UG/1
2 AEROSOL RESPIRATORY (INHALATION) 2 TIMES DAILY
Qty: 1 INHALER | Refills: 3 | Status: SHIPPED | OUTPATIENT
Start: 2019-02-21 | End: 2019-05-17 | Stop reason: ALTCHOICE

## 2019-02-21 RX ORDER — PRAVASTATIN SODIUM 20 MG
TABLET ORAL
Qty: 90 TABLET | Refills: 3 | Status: SHIPPED | OUTPATIENT
Start: 2019-02-21 | End: 2020-03-04

## 2019-02-21 ASSESSMENT — PATIENT HEALTH QUESTIONNAIRE - PHQ9
SUM OF ALL RESPONSES TO PHQ9 QUESTIONS 1 & 2: 0
SUM OF ALL RESPONSES TO PHQ QUESTIONS 1-9: 0
1. LITTLE INTEREST OR PLEASURE IN DOING THINGS: 0
SUM OF ALL RESPONSES TO PHQ QUESTIONS 1-9: 0
2. FEELING DOWN, DEPRESSED OR HOPELESS: 0

## 2019-02-21 ASSESSMENT — ENCOUNTER SYMPTOMS
COUGH: 1
WHEEZING: 1
SORE THROAT: 0
CONSTIPATION: 0
SHORTNESS OF BREATH: 1
ABDOMINAL PAIN: 0
RHINORRHEA: 0
DIARRHEA: 0

## 2019-05-17 ENCOUNTER — TELEPHONE (OUTPATIENT)
Dept: INTERNAL MEDICINE | Age: 74
End: 2019-05-17

## 2019-05-17 ENCOUNTER — OFFICE VISIT (OUTPATIENT)
Dept: INTERNAL MEDICINE | Age: 74
End: 2019-05-17
Payer: MEDICARE

## 2019-05-17 ENCOUNTER — HOSPITAL ENCOUNTER (OUTPATIENT)
Dept: LAB | Age: 74
Discharge: HOME | End: 2019-05-17
Payer: MEDICARE

## 2019-05-17 VITALS
SYSTOLIC BLOOD PRESSURE: 130 MMHG | DIASTOLIC BLOOD PRESSURE: 68 MMHG | HEART RATE: 102 BPM | OXYGEN SATURATION: 93 % | TEMPERATURE: 98.3 F | WEIGHT: 226.4 LBS | BODY MASS INDEX: 37.67 KG/M2

## 2019-05-17 DIAGNOSIS — Z77.29 CARBON MONOXIDE EXPOSURE: ICD-10-CM

## 2019-05-17 DIAGNOSIS — J44.1 CHRONIC OBSTRUCTIVE PULMONARY DISEASE WITH ACUTE EXACERBATION (HCC): Primary | ICD-10-CM

## 2019-05-17 DIAGNOSIS — Z77.29 CARBON MONOXIDE EXPOSURE: Primary | ICD-10-CM

## 2019-05-17 LAB — CARBOXYHEMOGLOBIN: 7.8 % (ref 0–4)

## 2019-05-17 PROCEDURE — 36415 COLL VENOUS BLD VENIPUNCTURE: CPT

## 2019-05-17 PROCEDURE — G8926 SPIRO NO PERF OR DOC: HCPCS | Performed by: NURSE PRACTITIONER

## 2019-05-17 PROCEDURE — 1123F ACP DISCUSS/DSCN MKR DOCD: CPT | Performed by: NURSE PRACTITIONER

## 2019-05-17 PROCEDURE — 4040F PNEUMOC VAC/ADMIN/RCVD: CPT | Performed by: NURSE PRACTITIONER

## 2019-05-17 PROCEDURE — G8417 CALC BMI ABV UP PARAM F/U: HCPCS | Performed by: NURSE PRACTITIONER

## 2019-05-17 PROCEDURE — G8427 DOCREV CUR MEDS BY ELIG CLIN: HCPCS | Performed by: NURSE PRACTITIONER

## 2019-05-17 PROCEDURE — G8400 PT W/DXA NO RESULTS DOC: HCPCS | Performed by: NURSE PRACTITIONER

## 2019-05-17 PROCEDURE — 3023F SPIROM DOC REV: CPT | Performed by: NURSE PRACTITIONER

## 2019-05-17 PROCEDURE — 1090F PRES/ABSN URINE INCON ASSESS: CPT | Performed by: NURSE PRACTITIONER

## 2019-05-17 PROCEDURE — 82375 ASSAY CARBOXYHB QUANT: CPT

## 2019-05-17 PROCEDURE — 99214 OFFICE O/P EST MOD 30 MIN: CPT | Performed by: NURSE PRACTITIONER

## 2019-05-17 PROCEDURE — 4004F PT TOBACCO SCREEN RCVD TLK: CPT | Performed by: NURSE PRACTITIONER

## 2019-05-17 RX ORDER — PREDNISONE 20 MG/1
TABLET ORAL
Qty: 20 TABLET | Refills: 0 | Status: SHIPPED | OUTPATIENT
Start: 2019-05-17 | End: 2020-07-23

## 2019-05-17 RX ORDER — CEFDINIR 300 MG/1
300 CAPSULE ORAL 2 TIMES DAILY
Qty: 20 CAPSULE | Refills: 0 | Status: SHIPPED | OUTPATIENT
Start: 2019-05-17 | End: 2019-05-27

## 2019-05-17 ASSESSMENT — ENCOUNTER SYMPTOMS
RHINORRHEA: 0
EYE DISCHARGE: 0
SORE THROAT: 0
CONSTIPATION: 0
TROUBLE SWALLOWING: 0
SINUS PRESSURE: 0
EYE REDNESS: 0
EYE PAIN: 0
SHORTNESS OF BREATH: 0
SINUS PAIN: 0
COUGH: 1
VOMITING: 0
CHEST TIGHTNESS: 0
EYE ITCHING: 0
DIARRHEA: 0
WHEEZING: 1
NAUSEA: 0

## 2019-05-17 NOTE — PROGRESS NOTES
Subjective  Office Visit  [place] [id number] [facility] PR[facility]  [address] [address] [place] [address]  Dept: [phone]  Dept Fax: [phone]: [phone]  Johana Mitchell  YOB: 1945  Age: 66 y.o. Sex: female  Date of Assessment:  5/17/2019  PCP: García Hwang MD    Chief Complaint   Patient presents with    Toxic Inhalation     discovered potential leak 5/15, would like testing done       HPI      Complains of: ? Carbon per patient someone came in to check the hot water tank and stated the tube in the back was just sitting on the floor she states they had a new hot water tank placed 2-3 years ago and they do not know how long it has been like that. She states the jada told her there may have been carbon monoxide released from the tube. She states it is hard to tell if she has any symptoms related to carbon monoxide or if it is her normal comorbid symptoms from COPD/asthma and older age. She states her normal comorbid symptoms do not seem any worse than normal. She also states that she remembers feeling nausea a couple times over this last month. But that occurs intermittently. She has a chronic cough with production and wheezing    Symptoms started: Wednesday    Denies symptoms of: V/D/C, chest pain, shortness of breath, fever, chills. Symptoms are: stable since that time. Treatment to date: nothing, which has been  na.     Relieving factors: nothing    Aggravating factors: nothing    Vitals    /68   Pulse 102   Temp 98.3 °F (36.8 °C) (Oral)   Wt 226 lb 6.4 oz (102.7 kg)   SpO2 93%   BMI 37.67 kg/m²     BP Readings from Last 3 Encounters:   05/17/19 130/68   02/21/19 108/68   10/18/18 130/72         Wt Readings from Last 3 Encounters:   05/17/19 226 lb 6.4 oz (102.7 kg)   02/21/19 229 lb (103.9 kg)   10/18/18 227 lb (103 kg)     Personal, Social, Family History and Allergies    Patient's medications, allergies, past medical, surgical, content normal. Cognition and memory are normal.   Nursing note and vitals reviewed. Assessment and Treatment     Diagnosis Orders   1. Chronic obstructive pulmonary disease with acute exacerbation (HCC)  predniSONE (DELTASONE) 20 MG tablet    cefdinir (OMNICEF) 300 MG capsule   2. Carbon monoxide exposure  Carboxyhemoglobin       Plan and Follow Up    Orders Placed This Encounter   Procedures    Carboxyhemoglobin     Standing Status:   Future     Number of Occurrences:   1     Standing Expiration Date:   2020       Orders Placed This Encounter   Medications    predniSONE (DELTASONE) 20 MG tablet     Si tabs po q am for 10 days     Dispense:  20 tablet     Refill:  0    cefdinir (OMNICEF) 300 MG capsule     Sig: Take 1 capsule by mouth 2 times daily for 10 days     Dispense:  20 capsule     Refill:  0       Return if symptoms worsen or fail to improve. Will draw lab and call with results. Patient is to follow up with PCP if any carbon monoxide symptoms appear or worsen. Advised to air the house out which they said they were already doing. Patient to follow with Emmy Delgado MD for all chronic condition management    Reviewed with the patient: current clinical status, medications, activities and diet. Side effects, adverse effects of the medicationprescribed today, as well as treatment plan/ rationale and result expectations have been discussed with the patient who expresses understanding and desires to proceed. Close follow up to evaluate treatment resultsand for coordination of care. I have reviewed the patient's medical history in detail and updated the computerized patient record. DANTE Mejia - CNP      No future appointments.

## 2019-05-17 NOTE — TELEPHONE ENCOUNTER
Called patient to discuss labs. Please read result note. Patient should schedule a follow up with PCP next week. Repeat lab on Monday. I also sent an antibiotic in to the pharmacy take as directed.

## 2019-05-20 DIAGNOSIS — Z77.29 CARBON MONOXIDE EXPOSURE: ICD-10-CM

## 2019-05-22 LAB
REASON FOR REJECTION: NORMAL
REJECTED TEST: NORMAL

## 2019-06-19 DIAGNOSIS — J44.1 CHRONIC OBSTRUCTIVE PULMONARY DISEASE WITH ACUTE EXACERBATION (HCC): ICD-10-CM

## 2019-06-19 DIAGNOSIS — J44.9 COPD WITH ASTHMA (HCC): ICD-10-CM

## 2019-06-19 RX ORDER — ALBUTEROL SULFATE 90 UG/1
AEROSOL, METERED RESPIRATORY (INHALATION)
Qty: 6.7 G | Refills: 3 | Status: SHIPPED | OUTPATIENT
Start: 2019-06-19 | End: 2019-10-15 | Stop reason: SDUPTHER

## 2019-06-19 NOTE — TELEPHONE ENCOUNTER
Rx requested:  Requested Prescriptions     Pending Prescriptions Disp Refills    albuterol sulfate HFA (PROVENTIL HFA) 108 (90 Base) MCG/ACT inhaler 6.7 g 3     Sig: inhale 2 puffs by mouth every 6 hours if needed for wheezing    ipratropium (ATROVENT HFA) 17 MCG/ACT inhaler 1 Inhaler 3     Sig: Inhale 2 puffs into the lungs 4 times daily       Last Office Visit:   2/21/2019    Last Labs:      Last filled:  multiple    Next Visit Date:  No future appointments.

## 2019-06-25 ENCOUNTER — TELEPHONE (OUTPATIENT)
Dept: FAMILY MEDICINE CLINIC | Age: 74
End: 2019-06-25

## 2019-08-27 ENCOUNTER — OFFICE VISIT (OUTPATIENT)
Dept: FAMILY MEDICINE CLINIC | Age: 74
End: 2019-08-27
Payer: MEDICARE

## 2019-08-27 ENCOUNTER — TELEPHONE (OUTPATIENT)
Dept: FAMILY MEDICINE CLINIC | Age: 74
End: 2019-08-27

## 2019-08-27 VITALS
HEIGHT: 66 IN | WEIGHT: 224.2 LBS | HEART RATE: 109 BPM | SYSTOLIC BLOOD PRESSURE: 126 MMHG | DIASTOLIC BLOOD PRESSURE: 76 MMHG | TEMPERATURE: 98.4 F | BODY MASS INDEX: 36.03 KG/M2 | OXYGEN SATURATION: 97 %

## 2019-08-27 DIAGNOSIS — H60.332 ACUTE SWIMMER'S EAR OF LEFT SIDE: Primary | ICD-10-CM

## 2019-08-27 PROCEDURE — G8400 PT W/DXA NO RESULTS DOC: HCPCS | Performed by: FAMILY MEDICINE

## 2019-08-27 PROCEDURE — 4040F PNEUMOC VAC/ADMIN/RCVD: CPT | Performed by: FAMILY MEDICINE

## 2019-08-27 PROCEDURE — G8417 CALC BMI ABV UP PARAM F/U: HCPCS | Performed by: FAMILY MEDICINE

## 2019-08-27 PROCEDURE — 1123F ACP DISCUSS/DSCN MKR DOCD: CPT | Performed by: FAMILY MEDICINE

## 2019-08-27 PROCEDURE — G8427 DOCREV CUR MEDS BY ELIG CLIN: HCPCS | Performed by: FAMILY MEDICINE

## 2019-08-27 PROCEDURE — 4130F TOPICAL PREP RX AOE: CPT | Performed by: FAMILY MEDICINE

## 2019-08-27 PROCEDURE — 1090F PRES/ABSN URINE INCON ASSESS: CPT | Performed by: FAMILY MEDICINE

## 2019-08-27 PROCEDURE — 4004F PT TOBACCO SCREEN RCVD TLK: CPT | Performed by: FAMILY MEDICINE

## 2019-08-27 PROCEDURE — 99213 OFFICE O/P EST LOW 20 MIN: CPT | Performed by: FAMILY MEDICINE

## 2019-08-27 RX ORDER — DILTIAZEM HYDROCHLORIDE 120 MG/1
CAPSULE, EXTENDED RELEASE ORAL
Refills: 0 | COMMUNITY
Start: 2019-08-20 | End: 2020-03-04

## 2019-08-27 RX ORDER — CIPROFLOXACIN AND DEXAMETHASONE 3; 1 MG/ML; MG/ML
4 SUSPENSION/ DROPS AURICULAR (OTIC) 2 TIMES DAILY
Qty: 7.5 ML | Refills: 0 | Status: SHIPPED | OUTPATIENT
Start: 2019-08-27 | End: 2019-09-03

## 2019-08-27 RX ORDER — HYDROCORTISONE AND ACETIC ACID 20.75; 10.375 MG/ML; MG/ML
4 SOLUTION AURICULAR (OTIC) 2 TIMES DAILY
Qty: 10 ML | Refills: 0 | Status: SHIPPED | OUTPATIENT
Start: 2019-08-27 | End: 2019-09-03

## 2019-08-27 RX ORDER — CIPROFLOXACIN HYDROCHLORIDE 3.5 MG/ML
4 SOLUTION/ DROPS TOPICAL 2 TIMES DAILY
Qty: 1 BOTTLE | Refills: 0 | Status: SHIPPED | OUTPATIENT
Start: 2019-08-27 | End: 2019-09-03

## 2019-08-27 ASSESSMENT — ENCOUNTER SYMPTOMS
SHORTNESS OF BREATH: 0
COUGH: 0
DIARRHEA: 0
WHEEZING: 0
SORE THROAT: 0
CONSTIPATION: 0
RHINORRHEA: 0
ABDOMINAL PAIN: 0

## 2019-08-27 NOTE — TELEPHONE ENCOUNTER
Called and left message for the pt with the medication that was sent over and to call if there are any issues with the cost of this one.

## 2019-08-27 NOTE — TELEPHONE ENCOUNTER
Pharmacy called stating cipro and dexamethasone eye drops in separate prescription need to be sent over, this would cost the patient less the $20 instead of $40. Please advise.

## 2019-08-27 NOTE — PROGRESS NOTES
[address] [facility] [address] [place] [address] PRIMARY CARE  [address] [place] [address]  Dept: [phone]  Dept Fax: [phone]  Loc: [phone]   Chief Complaint  Chief Complaint   Patient presents with   Ayana Pope     pt is here for possible ear infection with bleeding has been going on since saturday with the left ear pt states it is tender and soreness     Sinus Problem     pt has had sinus pressure and pain for awhile with a cough has drainage down the back of her throat and coughing up mucus        HPI:  66 y. o.female who presents for otalgia:    Upper Resp symptoms: increased pressure in the L ear x3 days; she pressed on her ear with her finger and it started bleeding heavily and stopped after a few minutes; She apply neosporin to the area. Still sore. Has some sinus congestion that is normal for summer for her.      Past Medical History:   Diagnosis Date    Allergic rhinitis     Asthma     HTN (hypertension)     Mastalgia in female 8/4/2014    TIA (transient ischemic attack)      Past Surgical History:   Procedure Laterality Date    APPENDECTOMY      BREAST LUMPECTOMY      CARPAL TUNNEL RELEASE Bilateral     CATARACT REMOVAL WITH IMPLANT Left 03/17/16    [facility] MD    CATARACT REMOVAL WITH IMPLANT Right 03/10/16    [facility] Edwin Sy MD    CHOLECYSTECTOMY      HERNIA REPAIR      HYSTERECTOMY      KNEE ARTHROSCOPY Right      Social History     Socioeconomic History    Marital status:      Spouse name: Not on file    Number of children: Not on file    Years of education: Not on file    Highest education level: Not on file   Occupational History    Not on file   Social Needs    Financial resource strain: Not on file    Food insecurity:     Worry: Not on file     Inability: Not on file    Transportation needs:     Medical: Not on file     Non-medical: Not on file   Tobacco Use    Smoking status: Current Some Day Smoker motion. Cardiovascular: Normal rate. Pulmonary/Chest: Effort normal. No respiratory distress. Musculoskeletal: She exhibits no edema. Neurological: She is alert and oriented to person, place, and time. Skin: Skin is warm and dry. Psychiatric: She has a normal mood and affect. Her behavior is normal.   Vitals reviewed. Assessment/Plan:  66 y.o. female here mainly for L OE:  - L OE: old blood in canal no TM changes; topical antibiotic drop for continued discomfort. No active bleeding. Diagnosis Orders   1. Acute swimmer's ear of left side  ciprofloxacin-dexamethasone (CIPRODEX) 0.3-0.1 % otic suspension        Return if symptoms worsen or fail to improve.     Prosper Knox MD

## 2019-10-15 DIAGNOSIS — J44.1 CHRONIC OBSTRUCTIVE PULMONARY DISEASE WITH ACUTE EXACERBATION (HCC): ICD-10-CM

## 2019-10-15 DIAGNOSIS — J44.9 COPD WITH ASTHMA (HCC): ICD-10-CM

## 2019-10-15 RX ORDER — ALBUTEROL SULFATE 90 UG/1
AEROSOL, METERED RESPIRATORY (INHALATION)
Qty: 6.7 G | Refills: 3 | Status: SHIPPED | OUTPATIENT
Start: 2019-10-15 | End: 2020-01-14 | Stop reason: SDUPTHER

## 2019-10-15 RX ORDER — IPRATROPIUM BROMIDE 17 UG/1
AEROSOL, METERED RESPIRATORY (INHALATION)
Qty: 12.9 G | Refills: 3 | Status: SHIPPED | OUTPATIENT
Start: 2019-10-15 | End: 2020-04-06

## 2019-11-20 ENCOUNTER — TELEPHONE (OUTPATIENT)
Dept: ENDOCRINOLOGY | Age: 74
End: 2019-11-20

## 2020-01-14 RX ORDER — ALBUTEROL SULFATE 90 UG/1
AEROSOL, METERED RESPIRATORY (INHALATION)
Qty: 6.7 G | Refills: 3 | Status: SHIPPED | OUTPATIENT
Start: 2020-01-14 | End: 2020-07-23 | Stop reason: SDUPTHER

## 2020-01-20 ENCOUNTER — TELEPHONE (OUTPATIENT)
Dept: FAMILY MEDICINE CLINIC | Age: 75
End: 2020-01-20

## 2020-01-20 RX ORDER — ALBUTEROL SULFATE 90 UG/1
2 AEROSOL, METERED RESPIRATORY (INHALATION) 4 TIMES DAILY PRN
Qty: 3 INHALER | Refills: 1 | Status: SHIPPED | OUTPATIENT
Start: 2020-01-20 | End: 2021-05-24 | Stop reason: SDUPTHER

## 2020-04-06 RX ORDER — IPRATROPIUM BROMIDE 17 UG/1
AEROSOL, METERED RESPIRATORY (INHALATION)
Qty: 12.9 G | Refills: 3 | Status: SHIPPED | OUTPATIENT
Start: 2020-04-06 | End: 2020-07-23 | Stop reason: SDUPTHER

## 2020-04-06 NOTE — TELEPHONE ENCOUNTER
Rx requested:  Requested Prescriptions     Pending Prescriptions Disp Refills    ATROVENT HFA 17 MCG/ACT inhaler [Pharmacy Med Name: ATROVENT 17 MCG HFA INHALER] 12.9 g 3     Sig: inhale 2 puffs by mouth four times a day       Last Office Visit:   8/27/2019      Last filled:  10/15/2019    Next Visit Date:  No future appointments.

## 2020-06-09 ENCOUNTER — TELEPHONE (OUTPATIENT)
Dept: FAMILY MEDICINE CLINIC | Age: 75
End: 2020-06-09

## 2020-07-23 ENCOUNTER — VIRTUAL VISIT (OUTPATIENT)
Dept: FAMILY MEDICINE CLINIC | Age: 75
End: 2020-07-23
Payer: MEDICARE

## 2020-07-23 PROBLEM — E66.01 MORBIDLY OBESE (HCC): Status: ACTIVE | Noted: 2020-07-23

## 2020-07-23 PROCEDURE — G0071 COMM SVCS BY RHC/FQHC 5 MIN: HCPCS | Performed by: FAMILY MEDICINE

## 2020-07-23 RX ORDER — PREDNISONE 20 MG/1
60 TABLET ORAL DAILY
Qty: 15 TABLET | Refills: 0 | Status: SHIPPED | OUTPATIENT
Start: 2020-07-23 | End: 2020-07-28

## 2020-07-23 RX ORDER — ALBUTEROL SULFATE 90 UG/1
AEROSOL, METERED RESPIRATORY (INHALATION)
Qty: 3 INHALER | Refills: 3 | Status: SHIPPED | OUTPATIENT
Start: 2020-07-23

## 2020-07-23 ASSESSMENT — PATIENT HEALTH QUESTIONNAIRE - PHQ9
SUM OF ALL RESPONSES TO PHQ QUESTIONS 1-9: 0
2. FEELING DOWN, DEPRESSED OR HOPELESS: 0
SUM OF ALL RESPONSES TO PHQ9 QUESTIONS 1 & 2: 0
1. LITTLE INTEREST OR PLEASURE IN DOING THINGS: 0
SUM OF ALL RESPONSES TO PHQ QUESTIONS 1-9: 0

## 2020-07-23 ASSESSMENT — ENCOUNTER SYMPTOMS
ABDOMINAL PAIN: 0
RHINORRHEA: 0
COUGH: 1
DIARRHEA: 0
WHEEZING: 0
CONSTIPATION: 0
SORE THROAT: 0
SHORTNESS OF BREATH: 0

## 2020-07-23 NOTE — PROGRESS NOTES
2020    TELEHEALTH EVALUATION -- Audio/Visual (During QIIPA-87 public health emergency)    Due to Luis 19 outbreak, patient's office visit was converted to a virtual visit. Patient was contacted and agreed to proceed with a virtual visit via Telephone Visit  The risks and benefits of converting to a virtual visit were discussed in light of the current infectious disease epidemic. Patient also understood that insurance coverage and co-pays are up to their individual insurance plans. Chief Complaint   Patient presents with    COPD     medication refills.  Arthritis     patient states that she cannot get tylenol and the pain is unbearable. HPI:    Christa Cantu (:  1945) has requested an audio/video evaluation for the following concern(s):    Resp: hx of COPD; having more coughing, congestion, sneezing. Doesn't like nasal sprays but the otc antihistamine isn't helpful. Joint pain: ankles, toes, knees, hips, wrists; used to see pain management with Dr. Krish Merlos and was getting back shots. Tylenol not adequate. Review of Systems   Constitutional: Negative for chills and fever. HENT: Negative for rhinorrhea and sore throat. Respiratory: Positive for cough. Negative for shortness of breath and wheezing. Gastrointestinal: Negative for abdominal pain, constipation and diarrhea. Endocrine: Negative for polydipsia and polyuria. Genitourinary: Negative for dysuria, frequency and urgency. Musculoskeletal: Positive for arthralgias. Neurological: Negative for syncope, light-headedness, numbness and headaches. Psychiatric/Behavioral: Negative for sleep disturbance. The patient is not nervous/anxious. Prior to Visit Medications    Medication Sig Taking?  Authorizing Provider   albuterol sulfate HFA (PROVENTIL HFA) 108 (90 Base) MCG/ACT inhaler inhale 2 puffs by mouth every 6 hours if needed for wheezing Yes Karina Rogel MD   ipratropium (ATROVENT HFA) 17 MCG/ACT inhaler inhale 2 puffs by mouth four times a day Yes Becky Cornelius MD   diclofenac (VOLTAREN) 50 MG EC tablet Take 1 tablet by mouth 2 times daily as needed for Pain Yes Becky Cornelius MD   predniSONE (DELTASONE) 20 MG tablet Take 3 tablets by mouth daily for 5 days Yes Becky Cornelius MD   CARTIA  MG extended release capsule take 1 capsule by mouth once daily Yes Becky Cornelius MD   pravastatin (PRAVACHOL) 20 MG tablet take 1 tablet by mouth once daily Yes Becky Cornelius MD   albuterol sulfate  (90 Base) MCG/ACT inhaler Inhale 2 puffs into the lungs 4 times daily as needed for Wheezing or Shortness of Breath Yes Becky Cornelius MD   ipratropium (ATROVENT) 0.02 % nebulizer solution Inhale into the lungs Yes Historical Provider, MD   DiphenhydrAMINE HCl (EQL ALLERGY RELIEF PO) Take by mouth Yes Historical Provider, MD   Handicap Placard [facility] by Does not apply route Yes Becky Cornelius MD   Nebulizer MISC Has machine and medicine. Needs cup and tubing supplies Yes Becky Cornelius MD   albuterol (PROVENTIL) (2.5 MG/3ML) 0.083% nebulizer solution Take 3 mLs by nebulization every 6 hours Yes Henry Patel MD   aspirin 81 MG tablet Take 81 mg by mouth daily  Yes Jay Whitt MD       Social History     Tobacco Use    Smoking status: Current Some Day Smoker     Packs/day: 1.00     Years: 55.00     Pack years: 55.00     Types: Cigarettes     Start date: 5/1/1950    Smokeless tobacco: Never Used   Substance Use Topics    Alcohol use: No    Drug use: No        Allergies   Allergen Reactions    Talwin [Pentazocine]     Azithromycin Swelling    Iv Dye [Iodides]     Singulair [Montelukast Sodium]     Tramadol     Vicodin [Hydrocodone-Acetaminophen] Other (See Comments)     Unsure of reaction.     Lisinopril Other (See Comments)     cough   ,   Past Medical History:   Diagnosis Date    Allergic rhinitis     Asthma     HTN (hypertension)     Mastalgia in female 8/4/2014    TIA (transient ischemic attack)    ,   Past Surgical History:   Procedure Laterality Date    APPENDECTOMY      BREAST LUMPECTOMY      CARPAL TUNNEL RELEASE Bilateral     CATARACT REMOVAL WITH IMPLANT Left 03/17/16    [facility] Lara Rodriguez MD    CATARACT REMOVAL WITH IMPLANT Right 03/10/16    [facility] Lara Rodriguez MD    CHOLECYSTECTOMY      HERNIA REPAIR      HYSTERECTOMY      KNEE ARTHROSCOPY Right    ,   Social History     Tobacco Use    Smoking status: Current Some Day Smoker     Packs/day: 1.00     Years: 55.00     Pack years: 55.00     Types: Cigarettes     Start date: 5/1/1950    Smokeless tobacco: Never Used   Substance Use Topics    Alcohol use: No    Drug use: No   , History reviewed. No pertinent family history. ,   Immunization History   Administered Date(s) Administered    Influenza Vaccine, unspecified formulation 09/01/2016    Influenza Virus Vaccine 10/04/2013    Influenza, High Dose (Fluzone 65 yrs and older) 11/05/2015, 09/12/2018    Pneumococcal Conjugate 7-valent (Prevnar7) 11/04/2011    Pneumococcal Polysaccharide ([id number]) 11/04/2011       PHYSICAL EXAMINATION:  [ INSTRUCTIONS:  \"[x]\" Indicates a positive item  \"[]\" Indicates a negative item  -- DELETE ALL ITEMS NOT EXAMINED]  [x] Alert  [x] Oriented to person/place/time    [x] No apparent distress  [] Toxic appearing    [] Face flushed appearing [] Sclera clear  [] Lips are cyanotic      [x] Breathing appears normal  [] Appears tachypneic      [] Rash on visible skin    [] Cranial Nerves II-XII grossly intact    [] Motor grossly intact in visible upper extremities    [] Motor grossly intact in visible lower extremities    [] Normal Mood  [] Anxious appearing    [] Depressed appearing  [] Confused appearing      [] Poor short term memory  [] Poor long term memory    [] OTHER:      Due to this being a TeleHealth encounter, evaluation of the following organ systems is limited: Vitals/Constitutional/EENT/Resp/CV/GI//MS/Neuro/Skin/Heme-Lymph-Imm. ASSESSMENT/PLAN:  - lots of coughing on the phone; steroid x5 days; possible early COPD exac  - Joint pains: will try diclofenac    1. Chronic obstructive pulmonary disease with acute exacerbation (HCC)    - albuterol sulfate HFA (PROVENTIL HFA) 108 (90 Base) MCG/ACT inhaler; inhale 2 puffs by mouth every 6 hours if needed for wheezing  Dispense: 3 Inhaler; Refill: 3  - predniSONE (DELTASONE) 20 MG tablet; Take 3 tablets by mouth daily for 5 days  Dispense: 15 tablet; Refill: 0    2. COPD with asthma ([address]    - ipratropium (ATROVENT HFA) 17 MCG/ACT inhaler; inhale 2 puffs by mouth four times a day  Dispense: 12.9 g; Refill: 3  - predniSONE (DELTASONE) 20 MG tablet; Take 3 tablets by mouth daily for 5 days  Dispense: 15 tablet; Refill: 0    3. At high risk for falls      4. Multiple joint pain    - diclofenac (VOLTAREN) 50 MG EC tablet; Take 1 tablet by mouth 2 times daily as needed for Pain  Dispense: 60 tablet; Refill: 1    5. Morbidly obese ([address]        Return if symptoms worsen or fail to improve. 21 or more minutes were spent on the digital evaluation and management of this patient. An  electronic signature was used to authenticate this note. --Cally Warren MD on 7/23/2020 at 12:47 PM        Pursuant to the emergency declaration under the [address] waiver authority and the [employer] Act, this Virtual  Visit was conducted, with patient's consent, to reduce the patient's risk of exposure to COVID-19 and provide continuity of care for an established patient. Services were provided through a video synchronous discussion virtually to substitute for in-person clinic visit. On the basis of positive falls risk screening, assessment and plan is as follows: patient declines any further evaluation/treatment for increased falls risk.

## 2020-08-28 RX ORDER — DILTIAZEM HYDROCHLORIDE 120 MG/1
CAPSULE, COATED, EXTENDED RELEASE ORAL
Qty: 90 CAPSULE | Refills: 1 | Status: SHIPPED | OUTPATIENT
Start: 2020-08-28 | End: 2021-02-22 | Stop reason: SDUPTHER

## 2020-08-28 NOTE — TELEPHONE ENCOUNTER
Rx requested:  Requested Prescriptions     Pending Prescriptions Disp Refills    dilTIAZem (CARDIZEM CD) 120 MG extended release capsule [Pharmacy Med Name: DILTIAZEM 24H ER(CD) 120 MG CP] 90 capsule 1     Sig: take 1 capsule by mouth once daily       Last Office Visit:   7/23/2020      Last filled:  2/21/2019    Next Visit Date:  No future appointments.

## 2020-12-28 NOTE — TELEPHONE ENCOUNTER
Rx requested:  Requested Prescriptions     Pending Prescriptions Disp Refills    ipratropium (ATROVENT HFA) 17 MCG/ACT inhaler [Pharmacy Med Name: ATROVENT 17 MCG HFA INHALER] 12.9 g 3     Sig: inhale 2 puffs by mouth four times a day       Last Office Visit:   7/23/2020      Last filled:  7/23/2020    Next Visit Date:  No future appointments.

## 2021-02-22 ENCOUNTER — OFFICE VISIT (OUTPATIENT)
Dept: FAMILY MEDICINE CLINIC | Age: 76
End: 2021-02-22
Payer: MEDICARE

## 2021-02-22 DIAGNOSIS — E66.01 MORBIDLY OBESE (HCC): ICD-10-CM

## 2021-02-22 DIAGNOSIS — I10 ESSENTIAL HYPERTENSION: ICD-10-CM

## 2021-02-22 DIAGNOSIS — Z11.59 NEED FOR HEPATITIS C SCREENING TEST: ICD-10-CM

## 2021-02-22 DIAGNOSIS — J44.9 COPD WITH ASTHMA (HCC): ICD-10-CM

## 2021-02-22 DIAGNOSIS — Z00.00 ROUTINE GENERAL MEDICAL EXAMINATION AT A HEALTH CARE FACILITY: Primary | ICD-10-CM

## 2021-02-22 DIAGNOSIS — E78.5 HYPERLIPIDEMIA, UNSPECIFIED HYPERLIPIDEMIA TYPE: ICD-10-CM

## 2021-02-22 PROCEDURE — G8484 FLU IMMUNIZE NO ADMIN: HCPCS | Performed by: FAMILY MEDICINE

## 2021-02-22 PROCEDURE — 1123F ACP DISCUSS/DSCN MKR DOCD: CPT | Performed by: FAMILY MEDICINE

## 2021-02-22 PROCEDURE — 4040F PNEUMOC VAC/ADMIN/RCVD: CPT | Performed by: FAMILY MEDICINE

## 2021-02-22 PROCEDURE — G0438 PPPS, INITIAL VISIT: HCPCS | Performed by: FAMILY MEDICINE

## 2021-02-22 RX ORDER — DILTIAZEM HYDROCHLORIDE 120 MG/1
CAPSULE, COATED, EXTENDED RELEASE ORAL
Qty: 90 CAPSULE | Refills: 3 | Status: SHIPPED | OUTPATIENT
Start: 2021-02-22

## 2021-02-22 RX ORDER — PRAVASTATIN SODIUM 20 MG
TABLET ORAL
Qty: 90 TABLET | Refills: 3 | Status: SHIPPED | OUTPATIENT
Start: 2021-02-22

## 2021-02-22 SDOH — ECONOMIC STABILITY: FOOD INSECURITY: WITHIN THE PAST 12 MONTHS, THE FOOD YOU BOUGHT JUST DIDN'T LAST AND YOU DIDN'T HAVE MONEY TO GET MORE.: NEVER TRUE

## 2021-02-22 SDOH — ECONOMIC STABILITY: FOOD INSECURITY: WITHIN THE PAST 12 MONTHS, YOU WORRIED THAT YOUR FOOD WOULD RUN OUT BEFORE YOU GOT MONEY TO BUY MORE.: NEVER TRUE

## 2021-02-22 ASSESSMENT — PATIENT HEALTH QUESTIONNAIRE - PHQ9
2. FEELING DOWN, DEPRESSED OR HOPELESS: 0
1. LITTLE INTEREST OR PLEASURE IN DOING THINGS: 0
SUM OF ALL RESPONSES TO PHQ QUESTIONS 1-9: 0

## 2021-02-22 NOTE — PATIENT INSTRUCTIONS
Personalized Preventive Plan for Tiesha Lassiter - 2/22/2021  Medicare offers a range of preventive health benefits. Some of the tests and screenings are paid in full while other may be subject to a deductible, co-insurance, and/or copay. Some of these benefits include a comprehensive review of your medical history including lifestyle, illnesses that may run in your family, and various assessments and screenings as appropriate. After reviewing your medical record and screening and assessments performed today your provider may have ordered immunizations, labs, imaging, and/or referrals for you. A list of these orders (if applicable) as well as your Preventive Care list are included within your After Visit Summary for your review. Other Preventive Recommendations:    · A preventive eye exam performed by an eye specialist is recommended every 1-2 years to screen for glaucoma; cataracts, macular degeneration, and other eye disorders. · A preventive dental visit is recommended every 6 months. · Try to get at least 150 minutes of exercise per week or 10,000 steps per day on a pedometer . · Order or download the FREE \"Exercise & Physical Activity: Your Everyday Guide\" from [employer]. Call 1-581.453.6416 or search [employer] online. · You need 3644-4721 mg of calcium and 2083-1187 IU of vitamin D per day. It is possible to meet your calcium requirement with diet alone, but a vitamin D supplement is usually necessary to meet this goal.  · When exposed to the sun, use a sunscreen that protects against both UVA and UVB radiation with an SPF of 30 or greater. Reapply every 2 to 3 hours or after sweating, drying off with a towel, or swimming. · Always wear a seat belt when traveling in a car. Always wear a helmet when riding a bicycle or motorcycle.

## 2021-02-22 NOTE — PROGRESS NOTES
Medicare Annual Wellness Visit  Are Name: Curly Schuler Date: 2021   MRN: [id number] Sex: Female   Age: 66 y.o. Ethnicity: Non-/Non    : 1945 Race: Dali Bermudez is here for Medicare AWV and Health Maintenance (Refused vaccines )    Screenings for behavioral, psychosocial and functional/safety risks, and cognitive dysfunction are all negative except as indicated below. These results, as well as other patient data from the [address] [facility] [address] form, are documented in Flowsheets linked to this Encounter. Allergies   Allergen Reactions    Talwin [Pentazocine]     Azithromycin Swelling    Iv Dye [Iodides]     Singulair [Montelukast Sodium]     Tramadol     Vicodin [Hydrocodone-Acetaminophen] Other (See Comments)     Unsure of reaction.  Lisinopril Other (See Comments)     cough       Prior to Visit Medications    Medication Sig Taking?  Authorizing Provider   ipratropium (ATROVENT HFA) 17 MCG/ACT inhaler inhale 2 puffs by mouth four times a day Yes Bartolo Hair MD   dilTIAZem (CARDIZEM CD) 120 MG extended release capsule take 1 capsule by mouth once daily Yes Bartolo Hair MD   albuterol sulfate HFA (PROVENTIL HFA) 108 (90 Base) MCG/ACT inhaler inhale 2 puffs by mouth every 6 hours if needed for wheezing Yes Bartolo Hair MD   diclofenac (VOLTAREN) 50 MG EC tablet Take 1 tablet by mouth 2 times daily as needed for Pain Yes Bartolo Hair MD   pravastatin (PRAVACHOL) 20 MG tablet take 1 tablet by mouth once daily Yes Bartolo Hair MD   albuterol sulfate  (90 Base) MCG/ACT inhaler Inhale 2 puffs into the lungs 4 times daily as needed for Wheezing or Shortness of Breath Yes Bartolo Hair MD   ipratropium (ATROVENT) 0.02 % nebulizer solution Inhale into the lungs Yes Historical Provider, MD   DiphenhydrAMINE HCl (EQL ALLERGY RELIEF PO) Take by mouth Yes Historical Provider, MD   Handicap Placard [facility] by Does not apply route Yes Bartolo Hair MD Nebulizer MISC Has machine and medicine. Needs cup and tubing supplies Yes Janice Hewitt MD   albuterol (PROVENTIL) (2.5 MG/3ML) 0.083% nebulizer solution Take 3 mLs by nebulization every 6 hours Yes Jeri Velazco MD   aspirin 81 MG tablet Take 81 mg by mouth daily  Yes Aga Marlow MD       Past Medical History:   Diagnosis Date    Allergic rhinitis     Asthma     HTN (hypertension)     Mastalgia in female 8/4/2014    TIA (transient ischemic attack)        Past Surgical History:   Procedure Laterality Date    APPENDECTOMY      BREAST LUMPECTOMY      CARPAL TUNNEL RELEASE Bilateral     CATARACT REMOVAL WITH IMPLANT Left 03/17/16    [facility] Lise Cohen MD    CATARACT REMOVAL WITH IMPLANT Right 03/10/16    [facility] Lise Cohen MD    CHOLECYSTECTOMY      HERNIA REPAIR      HYSTERECTOMY      KNEE ARTHROSCOPY Right        No family history on file. CareTeam (Including outside providers/suppliers regularly involved in providing care):   Patient Care Team:  Janice Hewitt MD as PCP - General (Family Medicine)  Janice Hewitt MD as PCP - [facility] Empaneled Provider    Wt Readings from Last 3 Encounters:   08/27/19 224 lb 3.2 oz (101.7 kg)   05/17/19 226 lb 6.4 oz (102.7 kg)   02/21/19 229 lb (103.9 kg)      No flowsheet data found. There is no height or weight on file to calculate BMI. Based upon direct observation of the patient, evaluation of cognition reveals recent and remote memory intact. Patient's complete Health Risk Assessment and screening values have been reviewed and are found in Flowsheets. The following problems were reviewed today and where indicated follow up appointments were made and/or referrals ordered.     Positive Risk Factor Screenings with Interventions:         Substance History:  Social History     Tobacco History     Smoking Status  Current Some Day Smoker Smoking Start Date  5/1/1952 Smoking Frequency  1 pack/day for 55 years (54 pk yrs) Smoking Tobacco Type Cigarettes    Smokeless Tobacco Use  Never Used          Alcohol History     Alcohol Use Status  No          Drug Use     Drug Use Status  No          Sexual Activity     Sexually Active  Not Currently               Alcohol Screening:       A score of 8 or more is associated with harmful or hazardous drinking. A score of 13 or more in women, and 15 or more in men, is likely to indicate alcohol dependence. Substance Abuse Interventions:  · Tobacco abuse:  patient is not ready to work toward tobacco cessation at this time    [address] and ACP:  General  In general, how would you say your health is?: Good  In the past 7 days, have you experienced any of the following?  New or Increased Pain, New or Increased Fatigue, Loneliness, Social Isolation, Stress or Anger?: None of These  Do you get the social and emotional support that you need?: (!) No(pt does not go anywhere or have anyone)  Do you have a Living Will?: Yes  Advance Directives     Power of [facility] Will ACP-Advance Directive ACP-Power of     Not on File Not on File Not on File Not on File      General Health Risk Interventions:  · Social isolation: patient declines any further intervention for this issue    Health Habits/Nutrition:  Health Habits/Nutrition  Do you exercise for at least 20 minutes 2-3 times per week?: Yes  Have you lost any weight without trying in the past 3 months?: (!) Yes(5 to 10 lbs)  Do you eat only one meal per day?: (!) Yes(doesn't eat all day then eats in the middle of the night)  Have you seen the dentist within the past year?: (!) No     Health Habits/Nutrition Interventions:  · feels she has what she needs    Hearing/Vision:  No exam data present  Hearing/Vision  Do you or your family notice any trouble with your hearing that hasn't been managed with hearing aids?: No  Do you have difficulty driving, watching TV, or doing any of your daily activities because of your eyesight?: No Have you had an eye exam within the past year?: (!) No  Hearing/Vision Interventions:  · Vision concerns:  patient encouraged to make appointment with his/her eye specialist    Safety:  Safety  Do you have working smoke detectors?: (!) No  Have all throw rugs been removed or fastened?: Yes  Do you have non-slip mats or surfaces in all bathtubs/showers? : (walk in tub)  Do all of your stairways have a railing or banister?: Yes  Are your doorways, halls and stairs free of clutter?: Yes  Do you always fasten your seatbelt when you are in a car?: (!) No  Safety Interventions:  · Home safety tips provided    ADL:  ADLs  In the past 7 days, did you need help from others to perform any of the following everyday activities? Eating, dressing, grooming, bathing, toileting, or walking/balance?: None  In the past 7 days, did you need help from others to take care of any of the following?  Laundry, housekeeping, banking/finances, shopping, telephone use, food preparation, transportation, or taking medications?: (!) Shopping(some brings in the groceries)  ADL Interventions:  · Patient declines any further evaluation/treatment for this issue    Personalized Preventive Plan   Current Health Maintenance Status  Immunization History   Administered Date(s) Administered    Influenza Vaccine, unspecified formulation 09/01/2016    Influenza Virus Vaccine 10/04/2013    Influenza, High Dose (Fluzone 65 yrs and older) 11/05/2015, 09/12/2018    Influenza, Quadv, adjuvanted, 65 yrs +, IM, PF (Fluad) 09/01/2020    Pneumococcal Conjugate 7-valent (Prevnar7) 11/04/2011    Pneumococcal Polysaccharide ([id number]) 11/04/2011, 11/20/2020        Health Maintenance   Topic Date Due    Hepatitis C screen  1945    Low dose CT lung screening  03/03/1998    Lipid screen  02/03/2017    Annual Wellness Visit (AWV)  05/29/2019    DTaP/Tdap/Td vaccine (1 - Tdap) 02/22/2022 (Originally [birth year]/3/1962)  Shingles Vaccine (1 of 2) 02/22/2022 (Originally 3/3/1993)    COVID-19 Vaccine (1 of 2) 02/22/2022 (Originally [birth year]3/1959)    Flu vaccine  Completed    Pneumococcal 65+ years Vaccine  Completed    DEXA (modify frequency per FRAX score)  Addressed    Hepatitis A vaccine  Aged Out    Hepatitis B vaccine  Aged Out    Hib vaccine  Aged Out    Meningococcal (ACWY) vaccine  Aged Out     Recommendations for [employer] Due: see orders and patient instructions/AVS.  . Recommended screening schedule for the next 5-10 years is provided to the patient in written form: see Patient Instructions/AVS.    Zachary Chu was seen today for medicare awv and health maintenance. Diagnoses and all orders for this visit:    Need for hepatitis C screening test    Hyperlipidemia, unspecified hyperlipidemia type           She declined any testing that requires her to leave the house this year. Carlos Patricia is a 66 y.o. female being evaluated by a Virtual Visit (phone) encounter to address concerns as mentioned above. A caregiver was present when appropriate. Due to this being a TeleHealth encounter (During XSIFV-55 public health emergency), evaluation of the following organ systems was limited: Vitals/Constitutional/EENT/Resp/CV/GI//MS/Neuro/Skin/Heme-Lymph-Imm. Pursuant to the emergency declaration under the [address] [place] [address], [address] [place] [address] authority and the [employer] Act, this Virtual Visit was conducted with patient's (and/or legal guardian's) consent, to reduce the patient's risk of exposure to COVID-19 and provide necessary medical care. The patient (and/or legal guardian) has also been advised to contact this office for worsening conditions or problems, and seek emergency medical treatment and/or call 911 if deemed necessary.      Patient identification was verified at the start of the visit: Yes Services were provided through phone to substitute for in-person clinic visit. Patient and provider were located at their individual homes. --Bartolo Hair MD on 2/22/2021 at 1:23 PM    An electronic signature was used to authenticate this note.

## 2021-03-01 ENCOUNTER — TELEPHONE (OUTPATIENT)
Dept: FAMILY MEDICINE CLINIC | Age: 76
End: 2021-03-01

## 2021-03-01 NOTE — TELEPHONE ENCOUNTER
Pt called in today stating her Diltiazem was discontinued on 2/22/2021. State it was causing her to feel off balance. She said as soon as she stopped taking it she felt like a weight has been lifted off of her. States she took her bp today and it was 154/89. She is worried about it being high and would like to start a new medication for her BP. She does not want to take the diltiazem again.

## 2021-03-01 NOTE — TELEPHONE ENCOUNTER
We can do a visit to discuss options. Can check her BP a few times leading up to an appt and then decide on a med together.

## 2021-05-24 DIAGNOSIS — J44.9 COPD WITH ASTHMA (HCC): ICD-10-CM

## 2021-05-24 RX ORDER — ALBUTEROL SULFATE 90 UG/1
2 AEROSOL, METERED RESPIRATORY (INHALATION) 4 TIMES DAILY PRN
Qty: 3 INHALER | Refills: 1 | Status: SHIPPED | OUTPATIENT
Start: 2021-05-24 | End: 2021-12-14

## 2021-05-24 NOTE — TELEPHONE ENCOUNTER
Rx requested:  Requested Prescriptions     Pending Prescriptions Disp Refills    albuterol sulfate  (90 Base) MCG/ACT inhaler 3 Inhaler 1     Sig: Inhale 2 puffs into the lungs 4 times daily as needed for Wheezing or Shortness of Breath       Last Office Visit:   2/22/2021      Last filled:  1/20/2020    Next Visit Date:  Future Appointments   Date Time Provider Aba Osullivan   2/23/2022 11:30 AM Rajeev Baker MD [address] [place] [address]

## 2021-06-01 DIAGNOSIS — J44.9 COPD WITH ASTHMA (HCC): ICD-10-CM

## 2021-06-01 NOTE — TELEPHONE ENCOUNTER
Rx requested:  Requested Prescriptions     Pending Prescriptions Disp Refills    ipratropium (ATROVENT HFA) 17 MCG/ACT inhaler 12.9 g 3     Sig: inhale 2 puffs by mouth four times a day       Last Office Visit:   2/22/2021      Last filled:  12/28/2020    Next Visit Date:  Future Appointments   Date Time Provider Aba Osullivan   2/23/2022 11:30 AM Jessica Jones MD [address] [place] [address]

## 2021-10-19 DIAGNOSIS — J44.9 COPD WITH ASTHMA (HCC): ICD-10-CM

## 2021-10-19 NOTE — TELEPHONE ENCOUNTER
Comments:     Last Office Visit (last PCP visit):   2/22/2021    Next Visit Date:  Future Appointments   Date Time Provider Aba Osullivan   2/23/2022 11:30 AM Maico Jenkins MD [facility]       **If hasn't been seen in over a year OR hasn't followed up according to last diabetes/ADHD visit, make appointment for patient before sending refill to provider.     Rx requested:  Requested Prescriptions     Pending Prescriptions Disp Refills    ipratropium (ATROVENT HFA) 17 MCG/ACT inhaler [Pharmacy Med Name: ATROVENT 17 MCG HFA INHALER] 12.9 g 3     Sig: inhale 2 puffs by mouth and INTO THE LUNGS four times a day

## 2021-12-14 DIAGNOSIS — J44.9 COPD WITH ASTHMA (HCC): ICD-10-CM

## 2021-12-14 RX ORDER — ALBUTEROL SULFATE 90 MCG
HFA AEROSOL WITH ADAPTER (GRAM) INHALATION
Qty: 20.1 G | Refills: 3 | Status: SHIPPED | OUTPATIENT
Start: 2021-12-14

## 2021-12-14 NOTE — TELEPHONE ENCOUNTER
Comments:     Last Office Visit (last PCP visit):   2/22/2021    Next Visit Date:  Future Appointments   Date Time Provider Aba Osullivan   2/23/2022 11:30 AM Ariel Larson MD [facility]       **If hasn't been seen in over a year OR hasn't followed up according to last diabetes/ADHD visit, make appointment for patient before sending refill to provider.     Rx requested:  Requested Prescriptions     Pending Prescriptions Disp Refills    PROVENTIL  (90 Base) MCG/ACT inhaler [Pharmacy Med Name: PROVENTIL HFA 90 MCG INHALER] 20.1 g      Sig: inhale 2 puffs by mouth and INTO THE LUNGS four times a day if needed for wheezing or shortness of breath

## 2022-03-17 DIAGNOSIS — J44.9 COPD WITH ASTHMA (HCC): ICD-10-CM

## 2022-03-18 ENCOUNTER — TELEPHONE (OUTPATIENT)
Dept: FAMILY MEDICINE CLINIC | Age: 77
End: 2022-03-18

## 2022-03-18 ENCOUNTER — SCHEDULED TELEPHONE ENCOUNTER (OUTPATIENT)
Dept: FAMILY MEDICINE CLINIC | Age: 77
End: 2022-03-18
Payer: MEDICARE

## 2022-03-18 DIAGNOSIS — J44.9 COPD WITH ASTHMA (HCC): ICD-10-CM

## 2022-03-18 PROCEDURE — G2025 DIS SITE TELE SVCS RHC/FQHC: HCPCS | Performed by: FAMILY MEDICINE

## 2022-03-18 SDOH — ECONOMIC STABILITY: FOOD INSECURITY: WITHIN THE PAST 12 MONTHS, THE FOOD YOU BOUGHT JUST DIDN'T LAST AND YOU DIDN'T HAVE MONEY TO GET MORE.: NEVER TRUE

## 2022-03-18 SDOH — ECONOMIC STABILITY: FOOD INSECURITY: WITHIN THE PAST 12 MONTHS, YOU WORRIED THAT YOUR FOOD WOULD RUN OUT BEFORE YOU GOT MONEY TO BUY MORE.: NEVER TRUE

## 2022-03-18 ASSESSMENT — ENCOUNTER SYMPTOMS
COUGH: 1
WHEEZING: 0
SHORTNESS OF BREATH: 0
RHINORRHEA: 1
CONSTIPATION: 0
SORE THROAT: 0
DIARRHEA: 0
ABDOMINAL PAIN: 0

## 2022-03-18 ASSESSMENT — PATIENT HEALTH QUESTIONNAIRE - PHQ9
SUM OF ALL RESPONSES TO PHQ QUESTIONS 1-9: 0
2. FEELING DOWN, DEPRESSED OR HOPELESS: 0
SUM OF ALL RESPONSES TO PHQ QUESTIONS 1-9: 0
1. LITTLE INTEREST OR PLEASURE IN DOING THINGS: 0
SUM OF ALL RESPONSES TO PHQ9 QUESTIONS 1 & 2: 0

## 2022-03-18 ASSESSMENT — SOCIAL DETERMINANTS OF HEALTH (SDOH): HOW HARD IS IT FOR YOU TO PAY FOR THE VERY BASICS LIKE FOOD, HOUSING, MEDICAL CARE, AND HEATING?: NOT HARD AT ALL

## 2022-03-18 NOTE — TELEPHONE ENCOUNTER
Patient called to check on the status of her refill request and when told that she will need an appointment before it can be filled she became upset. She stated that she and her  do not leave the house for any reason so she can not come in for an appointment. She also states that she is out of Atrovent and is having a hard time breathing.

## 2022-03-18 NOTE — PROGRESS NOTES
REMOVAL WITH IMPLANT Right 03/10/16    [facility] Sp Reyes MD    CHOLECYSTECTOMY      HERNIA REPAIR      HYSTERECTOMY      KNEE ARTHROSCOPY Right      Social History     Socioeconomic History    Marital status:      Spouse name: Not on file    Number of children: Not on file    Years of education: Not on file    Highest education level: Not on file   Occupational History    Not on file   Tobacco Use    Smoking status: Current Some Day Smoker     Packs/day: 1.00     Years: 55.00     Pack years: 55.00     Types: Cigarettes     Start date: 5/1/1950    Smokeless tobacco: Never Used   Substance and Sexual Activity    Alcohol use: No    Drug use: No    Sexual activity: Not Currently   Other Topics Concern    Not on file   Social History Narrative    Not on file     Social Determinants of Health     Financial Resource Strain: Low Risk     Difficulty of Paying Living Expenses: Not hard at all   Food Insecurity: No Food Insecurity    Worried About [address] in the Last Year: Never true    [facility] in the Last Year: Never true   Transportation Needs:     Lack of Transportation (Medical): Not on file    Lack of Transportation (Non-Medical):  Not on file   Physical Activity:     Days of Exercise per Week: Not on file    Minutes of Exercise per Session: Not on file   Stress:     Feeling of Stress : Not on file   Social Connections:     Frequency of Communication with Friends and Family: Not on file    Frequency of Social Gatherings with Friends and Family: Not on file    Attends Mosque Services: Not on file    Active Member of Clubs or Organizations: Not on file    Attends Club or Organization Meetings: Not on file    Marital Status: Not on file   Intimate Partner Violence:     Fear of Current or Ex-Partner: Not on file    Emotionally Abused: Not on file    Physically Abused: Not on file    Sexually Abused: Not on file   Housing Stability:     Unable to Pay for Housing in the Last Year: Not on file    Number of Places Lived in the Last Year: Not on file    Unstable Housing in the Last Year: Not on file     History reviewed. No pertinent family history. Allergies   Allergen Reactions    Talwin [Pentazocine]     Azithromycin Swelling    Iv Dye [Iodides]     Singulair [Montelukast Sodium]     Tramadol     Vicodin [Hydrocodone-Acetaminophen] Other (See Comments)     Unsure of reaction.  Lisinopril Other (See Comments)     cough     Current Outpatient Medications   Medication Sig Dispense Refill    ipratropium (ATROVENT HFA) 17 MCG/ACT inhaler inhale 2 puffs by mouth and INTO THE LUNGS four times a day 12.9 g 11    PROVENTIL  (90 Base) MCG/ACT inhaler inhale 2 puffs by mouth and INTO THE LUNGS four times a day if needed for wheezing or shortness of breath 20.1 g 3    pravastatin (PRAVACHOL) 20 MG tablet take 1 tablet by mouth once daily 90 tablet 3    dilTIAZem (CARDIZEM CD) 120 MG extended release capsule take 1 capsule by mouth once daily 90 capsule 3    albuterol sulfate HFA (PROVENTIL HFA) 108 (90 Base) MCG/ACT inhaler inhale 2 puffs by mouth every 6 hours if needed for wheezing 3 Inhaler 3    ipratropium (ATROVENT) 0.02 % nebulizer solution Inhale into the lungs      DiphenhydrAMINE HCl (EQL ALLERGY RELIEF PO) Take by mouth      Handicap Placard MISC by Does not apply route 1 each 0    Nebulizer MISC Has machine and medicine.  Needs cup and tubing supplies 1 each 0    albuterol (PROVENTIL) (2.5 MG/3ML) 0.083% nebulizer solution Take 3 mLs by nebulization every 6 hours 360 each 1    aspirin 81 MG tablet Take 81 mg by mouth daily  30 tablet 3     Current Facility-Administered Medications   Medication Dose Route Frequency Provider Last Rate Last Admin    methylPREDNISolone acetate (DEPO-MEDROL) injection 80 mg  80 mg IntraMUSCular Once Rita Razo MD             Physical exam:  Physical Exam  Constitutional:       General: She is not in acute distress. Appearance: Normal appearance. She is not ill-appearing. HENT:      Head: Normocephalic and atraumatic. Pulmonary:      Effort: Pulmonary effort is normal. No respiratory distress. Musculoskeletal:      Cervical back: Normal range of motion. Neurological:      Mental Status: She is alert. Assessment/Plan:  76 y.o. female here mainly for COPD:  - COPD: atrovent inhaler refilled     Diagnosis Orders   1. COPD with asthma ([facility] [address]  ipratropium (ATROVENT HFA) 17 MCG/ACT inhaler        11-20 minutes were spent on the digital evaluation and management of this patient. Return if symptoms worsen or fail to improve. Florinda Wilson MD       Pursuant to the emergency declaration under the [facility], [facility] waiver authority and the [employer] Act, this Virtual  Visit was conducted, with patient's consent, to reduce the patient's risk of exposure to COVID-19 and provide continuity of care for an established patient. Services were provided through a video synchronous discussion virtually to substitute for in-person clinic visit.

## 2022-11-25 DIAGNOSIS — J44.9 COPD WITH ASTHMA (HCC): ICD-10-CM

## 2022-11-25 NOTE — TELEPHONE ENCOUNTER
Comments: Pt due for annual, not seen since 2/22/21 for this; Pt refused to make a visit, wanted to do a virtual but refused to go to the hospital for blood work if it's ordered. Says she does not want to go anywhere she doesn't have to and she's definitely not going anywhere until after her upcoming eye appointment because she is currently seeing double and she's not sure if she's even going to make that. Is a virtual okay for this? Last Office Visit (last PCP visit):   3/18/2022    Next Visit Date:  No future appointments. **If hasn't been seen in over a year OR hasn't followed up according to last diabetes/ADHD visit, make appointment for patient before sending refill to provider.     Rx requested:  Requested Prescriptions     Pending Prescriptions Disp Refills    PROVENTIL  (90 Base) MCG/ACT inhaler [Pharmacy Med Name: PROVENTIL HFA 90 MCG INHALER] 20.1 g 3     Sig: inhale 2 puffs by mouth four times a day if needed for wheezing or shortness of breath

## 2022-11-27 NOTE — TELEPHONE ENCOUNTER
It's time to get seen by someone. If something is keeping her from leaving the house, we might be able to get a care coordinator involved or consider home health. But it's time for an evaluation as it doesn't sound like she is doing well.

## 2022-12-02 NOTE — TELEPHONE ENCOUNTER
Patient is aware of the message and recommendations. Refused home health and care coordinator. She said she currently has a lot of family issues going on right now and is not able to even think about coming to a doctors appointment. States she recently found out her son is going blind, and her  is in the nursing home and will probably not be coming home. She said she is not leaving the house due to covid because she does not want to get her  sick. Patient said if you are not willing to refill her prescription then she will just go without.

## 2022-12-06 RX ORDER — ALBUTEROL SULFATE 90 MCG
HFA AEROSOL WITH ADAPTER (GRAM) INHALATION
Qty: 20.1 G | Refills: 0 | OUTPATIENT
Start: 2022-12-06

## 2022-12-14 ENCOUNTER — TELEPHONE (OUTPATIENT)
Dept: FAMILY MEDICINE CLINIC | Age: 77
End: 2022-12-14

## 2022-12-14 NOTE — TELEPHONE ENCOUNTER
Patient returned call regarding her inhaler refill request. Patient was asked if she could do a video virtual visit and she said that her phone \"can't do that\". Patient was then asked if she had a family member or friend that could come over and use their phone for the video visit, patient stated \"No, they all work the same hours as you. \" Patient said that she didn't come in before due to her  being terminally ill and now that he has passed, she doesn't want to go anywhere; \"I just want to sit here and think! \"  Mentioned to to the patient that she needs her inhaler so we need to get her in to see the provider to which the patient responded with \"well he doesn't seem to think I need it\" and then she hung up.

## 2023-04-03 DIAGNOSIS — J44.9 COPD WITH ASTHMA (HCC): ICD-10-CM

## 2025-02-25 ENCOUNTER — OFFICE VISIT (OUTPATIENT)
Dept: FAMILY MEDICINE CLINIC | Age: 80
End: 2025-02-25
Payer: MEDICARE

## 2025-02-25 VITALS
OXYGEN SATURATION: 95 % | HEIGHT: 66 IN | TEMPERATURE: 97.2 F | WEIGHT: 200 LBS | HEART RATE: 88 BPM | BODY MASS INDEX: 32.14 KG/M2 | DIASTOLIC BLOOD PRESSURE: 90 MMHG | SYSTOLIC BLOOD PRESSURE: 142 MMHG

## 2025-02-25 DIAGNOSIS — Z91.81 AT HIGH RISK FOR FALLS: ICD-10-CM

## 2025-02-25 DIAGNOSIS — J44.89 COPD WITH ASTHMA (HCC): ICD-10-CM

## 2025-02-25 DIAGNOSIS — M10.9 ACUTE GOUT OF LEFT ANKLE, UNSPECIFIED CAUSE: Primary | ICD-10-CM

## 2025-02-25 DIAGNOSIS — M17.0 PRIMARY OSTEOARTHRITIS OF BOTH KNEES: ICD-10-CM

## 2025-02-25 DIAGNOSIS — J44.1 CHRONIC OBSTRUCTIVE PULMONARY DISEASE WITH ACUTE EXACERBATION (HCC): ICD-10-CM

## 2025-02-25 PROCEDURE — G8417 CALC BMI ABV UP PARAM F/U: HCPCS | Performed by: FAMILY MEDICINE

## 2025-02-25 PROCEDURE — 3023F SPIROM DOC REV: CPT | Performed by: FAMILY MEDICINE

## 2025-02-25 PROCEDURE — 1125F AMNT PAIN NOTED PAIN PRSNT: CPT | Performed by: FAMILY MEDICINE

## 2025-02-25 PROCEDURE — 3080F DIAST BP >= 90 MM HG: CPT | Performed by: FAMILY MEDICINE

## 2025-02-25 PROCEDURE — 99203 OFFICE O/P NEW LOW 30 MIN: CPT | Performed by: FAMILY MEDICINE

## 2025-02-25 PROCEDURE — 3077F SYST BP >= 140 MM HG: CPT | Performed by: FAMILY MEDICINE

## 2025-02-25 PROCEDURE — G8427 DOCREV CUR MEDS BY ELIG CLIN: HCPCS | Performed by: FAMILY MEDICINE

## 2025-02-25 PROCEDURE — 1090F PRES/ABSN URINE INCON ASSESS: CPT | Performed by: FAMILY MEDICINE

## 2025-02-25 PROCEDURE — G8400 PT W/DXA NO RESULTS DOC: HCPCS | Performed by: FAMILY MEDICINE

## 2025-02-25 PROCEDURE — 1159F MED LIST DOCD IN RCRD: CPT | Performed by: FAMILY MEDICINE

## 2025-02-25 PROCEDURE — 4004F PT TOBACCO SCREEN RCVD TLK: CPT | Performed by: FAMILY MEDICINE

## 2025-02-25 PROCEDURE — 1123F ACP DISCUSS/DSCN MKR DOCD: CPT | Performed by: FAMILY MEDICINE

## 2025-02-25 RX ORDER — PREDNISONE 20 MG/1
40 TABLET ORAL DAILY
Qty: 14 TABLET | Refills: 0 | Status: SHIPPED | OUTPATIENT
Start: 2025-02-25 | End: 2025-03-04

## 2025-02-25 RX ORDER — ALBUTEROL SULFATE 90 UG/1
INHALANT RESPIRATORY (INHALATION)
Qty: 3 EACH | Refills: 3 | Status: SHIPPED | OUTPATIENT
Start: 2025-02-25

## 2025-02-25 RX ORDER — ALBUTEROL SULFATE 90 UG/1
INHALANT RESPIRATORY (INHALATION)
Qty: 3 EACH | Refills: 3 | Status: SHIPPED
Start: 2025-02-25 | End: 2025-02-25 | Stop reason: SDUPTHER

## 2025-02-25 RX ORDER — ALBUTEROL SULFATE 0.83 MG/ML
2.5 SOLUTION RESPIRATORY (INHALATION) EVERY 6 HOURS
Qty: 360 EACH | Refills: 1 | Status: SHIPPED
Start: 2025-02-25 | End: 2025-02-25 | Stop reason: SDUPTHER

## 2025-02-25 RX ORDER — ALBUTEROL SULFATE 0.83 MG/ML
2.5 SOLUTION RESPIRATORY (INHALATION) EVERY 6 HOURS
Qty: 360 EACH | Refills: 1 | Status: SHIPPED | OUTPATIENT
Start: 2025-02-25

## 2025-02-25 RX ORDER — ALBUTEROL SULFATE 90 UG/1
INHALANT RESPIRATORY (INHALATION)
Qty: 20.1 G | Refills: 3 | Status: CANCELLED | OUTPATIENT
Start: 2025-02-25

## 2025-02-25 RX ORDER — ALBUTEROL SULFATE 90 UG/1
2 INHALANT RESPIRATORY (INHALATION) 4 TIMES DAILY PRN
Qty: 18 G | Refills: 0 | Status: CANCELLED | OUTPATIENT
Start: 2025-02-25

## 2025-02-25 SDOH — ECONOMIC STABILITY: FOOD INSECURITY: WITHIN THE PAST 12 MONTHS, YOU WORRIED THAT YOUR FOOD WOULD RUN OUT BEFORE YOU GOT MONEY TO BUY MORE.: NEVER TRUE

## 2025-02-25 SDOH — ECONOMIC STABILITY: FOOD INSECURITY: WITHIN THE PAST 12 MONTHS, THE FOOD YOU BOUGHT JUST DIDN'T LAST AND YOU DIDN'T HAVE MONEY TO GET MORE.: NEVER TRUE

## 2025-02-25 ASSESSMENT — PATIENT HEALTH QUESTIONNAIRE - PHQ9: DEPRESSION UNABLE TO ASSESS: PT REFUSES

## 2025-02-25 NOTE — PROGRESS NOTES
[facility]  [address] [place] [id number]  Dept: [phone]  Dept Fax: [phone]     Chief Complaint:  Chief Complaint   Patient presents with    Swelling     Redness, left foot, painful to the touch, x3 weeks; redness is spreading up her leg    Orders     Handicap Placard    Health Maintenance     Declined to do AWV today       Vitals:    02/25/25 1012   BP: (!) 142/90   Pulse: 88   Temp: 97.2 °F (36.2 °C)   TempSrc: Infrared   SpO2: 95%   Weight: 90.7 kg (200 lb)   Height: 1.664 m (5' 5.5\")       HPI:  79 y.o.female who presents for the following:      L Foot problem: x2-3 weeks; some pain; getting redder; pain with walking slightly; no inciting trauma; seems to be improving    -----------------------------------------------------------------------------    Assessment/Plan:  79 y.o. female here mainly for the following:  L ankle problem  Pain and swelling  I think gout is likely  7 days 40mg prednisone  Resp  Refilled inhalers and nebs  She declined a checkup or other med refills for now        ICD-10-CM    1. Acute gout of left ankle, unspecified cause  M10.9 predniSONE (DELTASONE) 20 MG tablet      2. Chronic obstructive pulmonary disease with acute exacerbation (HCC)  J44.1 Handicap Placard MISC     albuterol sulfate HFA (PROVENTIL HFA) 108 (90 Base) MCG/ACT inhaler      3. COPD with asthma (HCC)  J44.89 Handicap Placard MISC     albuterol (PROVENTIL) (2.5 MG/3ML) 0.083% nebulizer solution     ipratropium (ATROVENT HFA) 17 MCG/ACT inhaler      4. Primary osteoarthritis of both knees  M17.0 Handicap Placard MISC      5. At high risk for falls  Z91.81           Return if symptoms worsen or fail to improve.    Hebert Wright MD      -----------------------------------------------------------------------------      Objective     Physical Exam:  Physical Exam  Vitals reviewed.   Constitutional:       General: She is not in acute distress.     Appearance: She is

## 2025-02-25 NOTE — TELEPHONE ENCOUNTER
Patient would like this to go to [facility]. Heading to [facility] for the placard and will go to pharmacy after.

## 2025-02-25 NOTE — TELEPHONE ENCOUNTER
Comments: Pharmacy called and they are unable to fill ipratropium and albuterol inhalers.  They do not carry them at the pharmacy.      Last Office Visit (last PCP visit):   2/25/2025    Next Visit Date:  No future appointments.    **If hasn't been seen in over a year OR hasn't followed up according to last diabetes/ADHD visit, make appointment for patient before sending refill to provider.    Rx requested:  Requested Prescriptions     Pending Prescriptions Disp Refills    albuterol sulfate HFA (VENTOLIN HFA) 108 (90 Base) MCG/ACT inhaler 18 g 0     Sig: Inhale 2 puffs into the lungs 4 times daily as needed for Wheezing    ipratropium (ATROVENT HFA) 17 MCG/ACT inhaler  3     Sig: Inhale 1 puff into the lungs 3 times daily

## 2025-03-24 ENCOUNTER — TELEPHONE (OUTPATIENT)
Dept: FAMILY MEDICINE CLINIC | Age: 80
End: 2025-03-24

## 2025-03-24 DIAGNOSIS — M10.9 ACUTE GOUT OF LEFT ANKLE, UNSPECIFIED CAUSE: ICD-10-CM

## 2025-03-24 RX ORDER — PREDNISONE 20 MG/1
40 TABLET ORAL DAILY
Qty: 14 TABLET | Refills: 0 | Status: SHIPPED | OUTPATIENT
Start: 2025-03-24 | End: 2025-03-31

## 2025-03-24 NOTE — TELEPHONE ENCOUNTER
Pt declined, wants to know if she can try another round of the medication, pt uses [facility].

## 2025-03-24 NOTE — TELEPHONE ENCOUNTER
Pt states she saw you a few weeks ago and was dx with gout, said the medication prescribed has not helped and also noted she has been very careful about what she has been eating.

## 2025-03-24 NOTE — TELEPHONE ENCOUNTER
I've refilled the prednisone. If this doesn't improve your symptoms, then come in for a visit so I can take another look at the foot.